# Patient Record
Sex: FEMALE | Race: WHITE | NOT HISPANIC OR LATINO | URBAN - METROPOLITAN AREA
[De-identification: names, ages, dates, MRNs, and addresses within clinical notes are randomized per-mention and may not be internally consistent; named-entity substitution may affect disease eponyms.]

---

## 2021-08-17 ENCOUNTER — OUTPATIENT (OUTPATIENT)
Dept: OUTPATIENT SERVICES | Facility: HOSPITAL | Age: 66
LOS: 1 days | End: 2021-08-17
Payer: COMMERCIAL

## 2021-08-17 ENCOUNTER — TRANSCRIPTION ENCOUNTER (OUTPATIENT)
Age: 66
End: 2021-08-17

## 2021-08-17 VITALS
WEIGHT: 191.36 LBS | HEART RATE: 84 BPM | HEIGHT: 62 IN | TEMPERATURE: 98 F | SYSTOLIC BLOOD PRESSURE: 109 MMHG | DIASTOLIC BLOOD PRESSURE: 76 MMHG | RESPIRATION RATE: 19 BRPM | OXYGEN SATURATION: 100 %

## 2021-08-17 DIAGNOSIS — Z20.828 CONTACT WITH AND (SUSPECTED) EXPOSURE TO OTHER VIRAL COMMUNICABLE DISEASES: ICD-10-CM

## 2021-08-17 LAB — SARS-COV-2 RNA SPEC QL NAA+PROBE: SIGNIFICANT CHANGE UP

## 2021-08-17 PROCEDURE — U0003: CPT

## 2021-08-17 PROCEDURE — U0005: CPT

## 2021-08-17 NOTE — ASU PREOP CHECKLIST - SPO2 (%)
[facility] PreOP/Phase II    [address]    [place] [address]    Phone:  [phone]                                       After Visit Summary   4/26/2017    Melody Guidry    MRN: [id number]           After Visit Summary Signature Page     I have received my discharge instructions, and my questions have been answered. I have discussed any challenges I see with this plan with the nurse or doctor.    ..........................................................................................................................................  Patient/Patient Representative Signature      ..........................................................................................................................................  Patient Representative Print Name and Relationship to Patient    ..................................................               ................................................  Date                                            Time    ..........................................................................................................................................  Reviewed by Signature/Title    ...................................................              ..............................................  Date                                                            Time           100

## 2021-08-17 NOTE — ASU PATIENT PROFILE, ADULT - ABILITY TO HEAR (WITH HEARING AID OR HEARING APPLIANCE IF NORMALLY USED):
Uses b/l hearing aids/Mildly to Moderately Impaired: difficulty hearing in some environments or speaker may need to increase volume or speak distinctly

## 2021-08-18 ENCOUNTER — OUTPATIENT (OUTPATIENT)
Dept: OUTPATIENT SERVICES | Facility: HOSPITAL | Age: 66
LOS: 1 days | End: 2021-08-18
Payer: COMMERCIAL

## 2021-08-18 VITALS
DIASTOLIC BLOOD PRESSURE: 48 MMHG | RESPIRATION RATE: 15 BRPM | SYSTOLIC BLOOD PRESSURE: 102 MMHG | OXYGEN SATURATION: 96 % | HEART RATE: 88 BPM

## 2021-08-18 DIAGNOSIS — Z96.659 PRESENCE OF UNSPECIFIED ARTIFICIAL KNEE JOINT: Chronic | ICD-10-CM

## 2021-08-18 DIAGNOSIS — Z98.42 CATARACT EXTRACTION STATUS, LEFT EYE: Chronic | ICD-10-CM

## 2021-08-18 DIAGNOSIS — H44.002 UNSPECIFIED PURULENT ENDOPHTHALMITIS, LEFT EYE: ICD-10-CM

## 2021-08-18 DIAGNOSIS — Z90.49 ACQUIRED ABSENCE OF OTHER SPECIFIED PARTS OF DIGESTIVE TRACT: Chronic | ICD-10-CM

## 2021-08-18 LAB
GRAM STN FLD: SIGNIFICANT CHANGE UP
SPECIMEN SOURCE: SIGNIFICANT CHANGE UP

## 2021-08-18 PROCEDURE — 87205 SMEAR GRAM STAIN: CPT

## 2021-08-18 PROCEDURE — 87102 FUNGUS ISOLATION CULTURE: CPT

## 2021-08-18 PROCEDURE — 67036 REMOVAL OF INNER EYE FLUID: CPT | Mod: AS,LT

## 2021-08-18 PROCEDURE — 87070 CULTURE OTHR SPECIMN AEROBIC: CPT

## 2021-08-18 PROCEDURE — 67036 REMOVAL OF INNER EYE FLUID: CPT | Mod: LT

## 2021-08-18 RX ORDER — VANCOMYCIN HCL 1 G
0.1 VIAL (EA) INTRAVENOUS ONCE
Refills: 0 | Status: DISCONTINUED | OUTPATIENT
Start: 2021-08-18 | End: 2021-09-01

## 2021-08-18 RX ORDER — CEFTAZIDIME 6 G/30ML
0.1 INJECTION, POWDER, FOR SOLUTION INTRAVENOUS ONCE
Refills: 0 | Status: DISCONTINUED | OUTPATIENT
Start: 2021-08-18 | End: 2021-09-01

## 2021-08-18 NOTE — PROVIDER CONTACT NOTE (OTHER) - SITUATION
Patient c/o pain in left eye
Patient schedules for retina sx d/t endophthalmitis . patient c/o pain in left eye 5/10 and also has a headache.

## 2021-08-18 NOTE — ASU DISCHARGE PLAN (ADULT/PEDIATRIC) - CARE PROVIDER_API CALL
Abraham Mcgrath)  Ophthalmology  [address] [place] [address], [address]  [place] [address]  Phone: [phone]  Fax: [phone]  Scheduled Appointment: 08/19/2021 09:00 AM

## 2021-08-22 ENCOUNTER — TRANSCRIPTION ENCOUNTER (OUTPATIENT)
Age: 66
End: 2021-08-22

## 2021-09-01 LAB
CULTURE RESULTS: SIGNIFICANT CHANGE UP
SPECIMEN SOURCE: SIGNIFICANT CHANGE UP

## 2021-09-18 LAB
CULTURE RESULTS: SIGNIFICANT CHANGE UP
SPECIMEN SOURCE: SIGNIFICANT CHANGE UP

## 2022-06-07 PROBLEM — I10 ESSENTIAL (PRIMARY) HYPERTENSION: Chronic | Status: ACTIVE | Noted: 2021-08-18

## 2022-06-08 ENCOUNTER — NON-APPOINTMENT (OUTPATIENT)
Age: 67
End: 2022-06-08

## 2022-06-08 ENCOUNTER — APPOINTMENT (OUTPATIENT)
Dept: OPHTHALMOLOGY | Facility: CLINIC | Age: 67
End: 2022-06-08
Payer: COMMERCIAL

## 2022-06-08 PROCEDURE — 92020 GONIOSCOPY: CPT

## 2022-06-08 PROCEDURE — 92002 INTRM OPH EXAM NEW PATIENT: CPT

## 2022-06-08 PROCEDURE — 76513 OPH US DX ANT SGM US UNI/BI: CPT | Mod: LT

## 2022-10-29 NOTE — ASU PATIENT PROFILE, ADULT - BRAND OF COVID-19 VACCINATION
Pt resting with eyes closed. Vital signs stable on monitor. Spouse at bedside. Awaiting CT.      Ana Mcconnell RN  10/29/22 [id number]
TRANSFER - OUT REPORT:    Verbal report given to zaynab landers on Zehra Riojas  being transferred to [facility] room 347 for routine progression of patient care       Report consisted of patient's Situation, Background, Assessment and   Recommendations(SBAR). Information from the following report(s) ED Encounter Summary was reviewed with the receiving nurse. Lines:   Peripheral IV 10/28/22 Left Antecubital (Active)        Opportunity for questions and clarification was provided.       Patient transported with:         Octavia Lockwood RN  10/29/22 [id number]
Pfizer dose 1 and 2

## 2022-11-06 ENCOUNTER — APPOINTMENT (OUTPATIENT)
Dept: ORTHOPEDIC SURGERY | Facility: CLINIC | Age: 67
End: 2022-11-06

## 2022-11-06 VITALS — BODY MASS INDEX: 32.78 KG/M2 | WEIGHT: 192 LBS | HEIGHT: 64 IN

## 2022-11-06 DIAGNOSIS — M19.072 PRIMARY OSTEOARTHRITIS, LEFT ANKLE AND FOOT: ICD-10-CM

## 2022-11-06 PROCEDURE — 99214 OFFICE O/P EST MOD 30 MIN: CPT

## 2022-11-06 PROCEDURE — 73630 X-RAY EXAM OF FOOT: CPT | Mod: LT

## 2022-11-06 RX ORDER — DICLOFENAC SODIUM 1% 10 MG/G
1 GEL TOPICAL DAILY
Qty: 1 | Refills: 3 | Status: ACTIVE | COMMUNITY
Start: 2022-11-06 | End: 1900-01-01

## 2022-11-06 NOTE — HISTORY OF PRESENT ILLNESS
[4] : 4 [Localized] : localized [3] : 3 [Sharp] : sharp [Intermittent] : intermittent [Meds] : meds [de-identified] : 11/6/22: pt is a 65 y/o F presents with left foot pain; onset of pain on October 30th 2022; pt states there was no injury; pt states that she only has pain on a bump on the side of her foot.\par \par PMH: HTN.\par Allergies: NKDA. [FreeTextEntry9] : moses [] : no

## 2022-11-06 NOTE — ASSESSMENT
[FreeTextEntry1] : Recommend firm arch supports.\par Motrin prn pain.\par Pt will rto with Dr. Lindquist in weeks as pt is leaving on vacation. \par Voltaren gel qid prn pain provided.

## 2022-11-06 NOTE — IMAGING
[Right] : right foot [de-identified] : Left foot with mild swelling to the 1st TMT and MTP  joints and there is mild ttp over this region.\par All digits are nvi with FAROM.\par Left ankle strength is 5/5 in all planes.\par Left ankle rom is full and painfree.\par There is no ligamentous laxity noted. \par EHL/FHL strength is 5/5.\par Gait is normal. 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

## 2022-12-19 ENCOUNTER — APPOINTMENT (OUTPATIENT)
Dept: ORTHOPEDIC SURGERY | Facility: CLINIC | Age: 67
End: 2022-12-19

## 2022-12-19 VITALS — WEIGHT: 192 LBS | HEIGHT: 64 IN | BODY MASS INDEX: 32.78 KG/M2

## 2022-12-19 DIAGNOSIS — M76.822 POSTERIOR TIBIAL TENDINITIS, LEFT LEG: ICD-10-CM

## 2022-12-19 DIAGNOSIS — I10 ESSENTIAL (PRIMARY) HYPERTENSION: ICD-10-CM

## 2022-12-19 PROCEDURE — 20550 NJX 1 TENDON SHEATH/LIGAMENT: CPT

## 2022-12-19 PROCEDURE — 99214 OFFICE O/P EST MOD 30 MIN: CPT | Mod: 25

## 2022-12-19 NOTE — HISTORY OF PRESENT ILLNESS
[4] : 4 [3] : 3 [Localized] : localized [Sharp] : sharp [Intermittent] : intermittent [de-identified] : 12/19/2022: 1.5 month medial foot pain. no injury. went to [facility] and told to use voltaren. no prior foot probs. denies dm/tob  [] : Post Surgical Visit: no [FreeTextEntry1] : LT foot

## 2022-12-19 NOTE — ASSESSMENT
[FreeTextEntry1] : wbat\par supportive shoe\par nsaids prn\par PT\par requesting csi today\par f/up 3 months\par \par Medium Joint Injection was performed because of pain and inflammation Anesthesia: ethyl chloride sprayed topically. An injection of Celestone 1cc and Lidocaine without epinephrine 1cc was injected in the left posterior tibial tendon sheath.  Patient has tried OTC's including aspirin, Ibuprofen, Aleve etc or prescription NSAIDS, and/or exercises at home and/ or physical therapy without satisfactory response After verbal consent using sterile preparation and technique.The risks, benefits, and alternatives to cortisone injection were explained in full to the patient. Risks outlined include but are not limited to infection, sepsis, bleeding, scarring, skin discoloration, temporary increase in pain, syncopal episode, failure to resolve symptoms, allergic reaction, symptom recurrence, and elevation of blood sugar in diabetics. Patient understood the risks. All questions were answered. After discussion of options, patient requested an injection. Oral informed consent was obtained and sterile prep was done of the injection site. Sterile technique was utilized for the procedure including the preparation of the solutions used for the injection. Patient tolerated the procedure well. Advised to ice the injection site this evening. Prep with betadine locally to site. Sterile technique used.\par

## 2022-12-19 NOTE — IMAGING
[Left] : left foot [Outside films reviewed] : Outside films reviewed [There are no fractures, subluxations or dislocations. No significant abnormalities are seen] : There are no fractures, subluxations or dislocations. No significant abnormalities are seen

## 2022-12-19 NOTE — PHYSICAL EXAM
[Left] : left foot and ankle [Mild] : mild swelling of medial foot [NL (40)] : plantar flexion 40 degrees [NL 30)] : inversion 30 degrees [NL (20)] : eversion 20 degrees [5___] : eversion 5[unfilled]/5 [2+] : dorsalis pedis pulse: 2+ [] : patient ambulates without assistive device

## 2023-03-13 ENCOUNTER — APPOINTMENT (OUTPATIENT)
Dept: ORTHOPEDIC SURGERY | Facility: CLINIC | Age: 68
End: 2023-03-13

## 2023-06-03 ENCOUNTER — NON-APPOINTMENT (OUTPATIENT)
Age: 68
End: 2023-06-03

## 2023-08-14 ENCOUNTER — NON-APPOINTMENT (OUTPATIENT)
Age: 68
End: 2023-08-14

## 2023-08-22 ENCOUNTER — NON-APPOINTMENT (OUTPATIENT)
Age: 68
End: 2023-08-22

## 2023-09-16 ENCOUNTER — NON-APPOINTMENT (OUTPATIENT)
Age: 68
End: 2023-09-16

## 2023-09-17 ENCOUNTER — INPATIENT (INPATIENT)
Facility: HOSPITAL | Age: 68
LOS: 2 days | Discharge: HOME | DRG: 690 | End: 2023-09-20
Attending: INTERNAL MEDICINE | Admitting: INTERNAL MEDICINE
Payer: MEDICARE

## 2023-09-17 VITALS
TEMPERATURE: 99 F | HEIGHT: 64 IN | SYSTOLIC BLOOD PRESSURE: 137 MMHG | RESPIRATION RATE: 16 BRPM | OXYGEN SATURATION: 96 % | HEART RATE: 79 BPM | WEIGHT: 194.01 LBS | DIASTOLIC BLOOD PRESSURE: 81 MMHG

## 2023-09-17 DIAGNOSIS — K21.9 GASTRO-ESOPHAGEAL REFLUX DISEASE WITHOUT ESOPHAGITIS: ICD-10-CM

## 2023-09-17 DIAGNOSIS — N39.0 URINARY TRACT INFECTION, SITE NOT SPECIFIED: ICD-10-CM

## 2023-09-17 DIAGNOSIS — Z98.42 CATARACT EXTRACTION STATUS, LEFT EYE: Chronic | ICD-10-CM

## 2023-09-17 DIAGNOSIS — G47.00 INSOMNIA, UNSPECIFIED: ICD-10-CM

## 2023-09-17 DIAGNOSIS — Z90.49 ACQUIRED ABSENCE OF OTHER SPECIFIED PARTS OF DIGESTIVE TRACT: Chronic | ICD-10-CM

## 2023-09-17 DIAGNOSIS — I10 ESSENTIAL (PRIMARY) HYPERTENSION: ICD-10-CM

## 2023-09-17 DIAGNOSIS — Z29.9 ENCOUNTER FOR PROPHYLACTIC MEASURES, UNSPECIFIED: ICD-10-CM

## 2023-09-17 DIAGNOSIS — Z96.659 PRESENCE OF UNSPECIFIED ARTIFICIAL KNEE JOINT: Chronic | ICD-10-CM

## 2023-09-17 LAB
ALBUMIN SERPL ELPH-MCNC: 3.8 G/DL — SIGNIFICANT CHANGE UP (ref 3.3–5)
ALP SERPL-CCNC: 67 U/L — SIGNIFICANT CHANGE UP (ref 40–120)
ALT FLD-CCNC: 34 U/L — SIGNIFICANT CHANGE UP (ref 12–78)
ANION GAP SERPL CALC-SCNC: 11 MMOL/L — SIGNIFICANT CHANGE UP (ref 5–17)
APPEARANCE UR: CLEAR — SIGNIFICANT CHANGE UP
APTT BLD: 31.4 SEC — SIGNIFICANT CHANGE UP (ref 24.5–35.6)
AST SERPL-CCNC: 20 U/L — SIGNIFICANT CHANGE UP (ref 15–37)
BASOPHILS # BLD AUTO: 0.05 K/UL — SIGNIFICANT CHANGE UP (ref 0–0.2)
BASOPHILS NFR BLD AUTO: 1.1 % — SIGNIFICANT CHANGE UP (ref 0–2)
BILIRUB SERPL-MCNC: 0.6 MG/DL — SIGNIFICANT CHANGE UP (ref 0.2–1.2)
BILIRUB UR-MCNC: NEGATIVE — SIGNIFICANT CHANGE UP
BUN SERPL-MCNC: 16 MG/DL — SIGNIFICANT CHANGE UP (ref 7–23)
CALCIUM SERPL-MCNC: 9.3 MG/DL — SIGNIFICANT CHANGE UP (ref 8.5–10.1)
CHLORIDE SERPL-SCNC: 106 MMOL/L — SIGNIFICANT CHANGE UP (ref 96–108)
CO2 SERPL-SCNC: 24 MMOL/L — SIGNIFICANT CHANGE UP (ref 22–31)
COLOR SPEC: SIGNIFICANT CHANGE UP
CREAT SERPL-MCNC: 0.59 MG/DL — SIGNIFICANT CHANGE UP (ref 0.5–1.3)
DIFF PNL FLD: NEGATIVE — SIGNIFICANT CHANGE UP
EGFR: 99 ML/MIN/1.73M2 — SIGNIFICANT CHANGE UP
EOSINOPHIL # BLD AUTO: 0.06 K/UL — SIGNIFICANT CHANGE UP (ref 0–0.5)
EOSINOPHIL NFR BLD AUTO: 1.3 % — SIGNIFICANT CHANGE UP (ref 0–6)
GLUCOSE SERPL-MCNC: 108 MG/DL — HIGH (ref 70–99)
GLUCOSE UR QL: NEGATIVE MG/DL — SIGNIFICANT CHANGE UP
HCT VFR BLD CALC: 41.9 % — SIGNIFICANT CHANGE UP (ref 34.5–45)
HGB BLD-MCNC: 14.2 G/DL — SIGNIFICANT CHANGE UP (ref 11.5–15.5)
IMM GRANULOCYTES NFR BLD AUTO: 0.2 % — SIGNIFICANT CHANGE UP (ref 0–0.9)
INR BLD: 0.91 RATIO — SIGNIFICANT CHANGE UP (ref 0.85–1.18)
KETONES UR-MCNC: NEGATIVE MG/DL — SIGNIFICANT CHANGE UP
LACTATE SERPL-SCNC: 0.9 MMOL/L — SIGNIFICANT CHANGE UP (ref 0.7–2)
LEUKOCYTE ESTERASE UR-ACNC: ABNORMAL
LYMPHOCYTES # BLD AUTO: 0.61 K/UL — LOW (ref 1–3.3)
LYMPHOCYTES # BLD AUTO: 13.3 % — SIGNIFICANT CHANGE UP (ref 13–44)
MCHC RBC-ENTMCNC: 31.4 PG — SIGNIFICANT CHANGE UP (ref 27–34)
MCHC RBC-ENTMCNC: 33.9 GM/DL — SIGNIFICANT CHANGE UP (ref 32–36)
MCV RBC AUTO: 92.7 FL — SIGNIFICANT CHANGE UP (ref 80–100)
MONOCYTES # BLD AUTO: 0.33 K/UL — SIGNIFICANT CHANGE UP (ref 0–0.9)
MONOCYTES NFR BLD AUTO: 7.2 % — SIGNIFICANT CHANGE UP (ref 2–14)
NEUTROPHILS # BLD AUTO: 3.54 K/UL — SIGNIFICANT CHANGE UP (ref 1.8–7.4)
NEUTROPHILS NFR BLD AUTO: 76.9 % — SIGNIFICANT CHANGE UP (ref 43–77)
NITRITE UR-MCNC: NEGATIVE — SIGNIFICANT CHANGE UP
NRBC # BLD: 0 /100 WBCS — SIGNIFICANT CHANGE UP (ref 0–0)
PH UR: 6.5 — SIGNIFICANT CHANGE UP (ref 5–8)
PLATELET # BLD AUTO: 296 K/UL — SIGNIFICANT CHANGE UP (ref 150–400)
POTASSIUM SERPL-MCNC: 3.7 MMOL/L — SIGNIFICANT CHANGE UP (ref 3.5–5.3)
POTASSIUM SERPL-SCNC: 3.7 MMOL/L — SIGNIFICANT CHANGE UP (ref 3.5–5.3)
PROT SERPL-MCNC: 7.7 G/DL — SIGNIFICANT CHANGE UP (ref 6–8.3)
PROT UR-MCNC: NEGATIVE MG/DL — SIGNIFICANT CHANGE UP
PROTHROM AB SERPL-ACNC: 10.7 SEC — SIGNIFICANT CHANGE UP (ref 9.5–13)
RBC # BLD: 4.52 M/UL — SIGNIFICANT CHANGE UP (ref 3.8–5.2)
RBC # FLD: 12.9 % — SIGNIFICANT CHANGE UP (ref 10.3–14.5)
SARS-COV-2 RNA SPEC QL NAA+PROBE: SIGNIFICANT CHANGE UP
SODIUM SERPL-SCNC: 141 MMOL/L — SIGNIFICANT CHANGE UP (ref 135–145)
SP GR SPEC: 1 — SIGNIFICANT CHANGE UP (ref 1–1.03)
UROBILINOGEN FLD QL: 0.2 MG/DL — SIGNIFICANT CHANGE UP (ref 0.2–1)
WBC # BLD: 4.6 K/UL — SIGNIFICANT CHANGE UP (ref 3.8–10.5)
WBC # FLD AUTO: 4.6 K/UL — SIGNIFICANT CHANGE UP (ref 3.8–10.5)

## 2023-09-17 PROCEDURE — 99222 1ST HOSP IP/OBS MODERATE 55: CPT

## 2023-09-17 PROCEDURE — 99222 1ST HOSP IP/OBS MODERATE 55: CPT | Mod: GC

## 2023-09-17 PROCEDURE — 71045 X-RAY EXAM CHEST 1 VIEW: CPT | Mod: 26

## 2023-09-17 PROCEDURE — 99285 EMERGENCY DEPT VISIT HI MDM: CPT | Mod: FS

## 2023-09-17 PROCEDURE — 93010 ELECTROCARDIOGRAM REPORT: CPT

## 2023-09-17 RX ORDER — SODIUM CHLORIDE 9 MG/ML
1000 INJECTION INTRAMUSCULAR; INTRAVENOUS; SUBCUTANEOUS
Refills: 0 | Status: DISCONTINUED | OUTPATIENT
Start: 2023-09-17 | End: 2023-09-19

## 2023-09-17 RX ORDER — ERTAPENEM SODIUM 1 G/1
1000 INJECTION, POWDER, LYOPHILIZED, FOR SOLUTION INTRAMUSCULAR; INTRAVENOUS ONCE
Refills: 0 | Status: COMPLETED | OUTPATIENT
Start: 2023-09-17 | End: 2023-09-17

## 2023-09-17 RX ORDER — LOSARTAN POTASSIUM 100 MG/1
50 TABLET, FILM COATED ORAL DAILY
Refills: 0 | Status: DISCONTINUED | OUTPATIENT
Start: 2023-09-18 | End: 2023-09-20

## 2023-09-17 RX ORDER — LANOLIN ALCOHOL/MO/W.PET/CERES
3 CREAM (GRAM) TOPICAL AT BEDTIME
Refills: 0 | Status: DISCONTINUED | OUTPATIENT
Start: 2023-09-17 | End: 2023-09-18

## 2023-09-17 RX ORDER — ENOXAPARIN SODIUM 100 MG/ML
40 INJECTION SUBCUTANEOUS EVERY 24 HOURS
Refills: 0 | Status: DISCONTINUED | OUTPATIENT
Start: 2023-09-17 | End: 2023-09-20

## 2023-09-17 RX ORDER — LOSARTAN POTASSIUM 100 MG/1
50 TABLET, FILM COATED ORAL DAILY
Refills: 0 | Status: DISCONTINUED | OUTPATIENT
Start: 2023-09-17 | End: 2023-09-17

## 2023-09-17 RX ORDER — ACETAMINOPHEN 500 MG
650 TABLET ORAL EVERY 6 HOURS
Refills: 0 | Status: DISCONTINUED | OUTPATIENT
Start: 2023-09-17 | End: 2023-09-20

## 2023-09-17 RX ORDER — SODIUM CHLORIDE 9 MG/ML
1000 INJECTION INTRAMUSCULAR; INTRAVENOUS; SUBCUTANEOUS ONCE
Refills: 0 | Status: COMPLETED | OUTPATIENT
Start: 2023-09-17 | End: 2023-09-17

## 2023-09-17 RX ORDER — INFLUENZA VIRUS VACCINE 15; 15; 15; 15 UG/.5ML; UG/.5ML; UG/.5ML; UG/.5ML
0.7 SUSPENSION INTRAMUSCULAR ONCE
Refills: 0 | Status: DISCONTINUED | OUTPATIENT
Start: 2023-09-17 | End: 2023-09-20

## 2023-09-17 RX ORDER — ERTAPENEM SODIUM 1 G/1
1000 INJECTION, POWDER, LYOPHILIZED, FOR SOLUTION INTRAMUSCULAR; INTRAVENOUS EVERY 24 HOURS
Refills: 0 | Status: DISCONTINUED | OUTPATIENT
Start: 2023-09-18 | End: 2023-09-20

## 2023-09-17 RX ORDER — FAMOTIDINE 10 MG/ML
20 INJECTION INTRAVENOUS DAILY
Refills: 0 | Status: DISCONTINUED | OUTPATIENT
Start: 2023-09-17 | End: 2023-09-18

## 2023-09-17 RX ORDER — LACTOBACILLUS ACIDOPHILUS 100MM CELL
1 CAPSULE ORAL DAILY
Refills: 0 | Status: DISCONTINUED | OUTPATIENT
Start: 2023-09-17 | End: 2023-09-18

## 2023-09-17 RX ORDER — LOSARTAN POTASSIUM 100 MG/1
50 TABLET, FILM COATED ORAL ONCE
Refills: 0 | Status: COMPLETED | OUTPATIENT
Start: 2023-09-17 | End: 2023-09-17

## 2023-09-17 RX ADMIN — ENOXAPARIN SODIUM 40 MILLIGRAM(S): 100 INJECTION SUBCUTANEOUS at 18:51

## 2023-09-17 RX ADMIN — SODIUM CHLORIDE 1000 MILLILITER(S): 9 INJECTION INTRAMUSCULAR; INTRAVENOUS; SUBCUTANEOUS at 13:43

## 2023-09-17 RX ADMIN — SODIUM CHLORIDE 60 MILLILITER(S): 9 INJECTION INTRAMUSCULAR; INTRAVENOUS; SUBCUTANEOUS at 18:51

## 2023-09-17 RX ADMIN — ERTAPENEM SODIUM 120 MILLIGRAM(S): 1 INJECTION, POWDER, LYOPHILIZED, FOR SOLUTION INTRAMUSCULAR; INTRAVENOUS at 12:03

## 2023-09-17 RX ADMIN — Medication 650 MILLIGRAM(S): at 23:07

## 2023-09-17 RX ADMIN — SODIUM CHLORIDE 1000 MILLILITER(S): 9 INJECTION INTRAMUSCULAR; INTRAVENOUS; SUBCUTANEOUS at 12:03

## 2023-09-17 RX ADMIN — ERTAPENEM SODIUM 1000 MILLIGRAM(S): 1 INJECTION, POWDER, LYOPHILIZED, FOR SOLUTION INTRAMUSCULAR; INTRAVENOUS at 13:43

## 2023-09-17 RX ADMIN — Medication 3 MILLIGRAM(S): at 21:33

## 2023-09-17 NOTE — H&P ADULT - HISTORY OF PRESENT ILLNESS
67-year-old female with history of hypertension presents with continued dysuria, frequency, and urgency for the past month.  Patient reports a new sexual partner in June 2023.  Developed a UTI.  Was initially put on Macrobid and then switched to Cipro.  Developed another UTI last month and put on Cipro and then changed to Keflex.  States urinary symptoms have continued.  Saw a urologist 2 months ago and was told urine cultures show multiple resistance and will need IV antibiotics.  Patient states she misunderstood and thought she needed to go to the emergency room if a fever develop.  Patient denies any fever.  Patient got a culture report in the mail recently and thought the infection was susceptible to amoxicillin.  Went to urgent care this morning and was told she read the culture results a inaccurately and told to come to emergency room for IV antibiotics, admission, and to see infectious disease.  Patient denies any abdominal pain, flank pain, nausea, vomiting, fever, body aches.  Patient reports urinary symptoms have continued.  Denies any pain at rest but reports dysuria and urgency with urination.     ED course:  T 98.6, HR 79, /81, RR 16, SpO2 96% RA  S/P 1 L NS, Ertapenem 1 g   EKG:  Labs significant for Lactate 0.9, WBC 4.6,  BUN/Cr 16/0.59, K 3.7  U/A: Moderate Leuk esterase, negative nitrite, WBC 25, Many bacteria  COVID neg  Imaging:  CXR: unremarkable on wet read, FU official read 67-year-old female with history of HTN, insomnia, GERD presents with continued dysuria, frequency, and urgency for the past month.  Patient reports a new sexual partner in June 2023.  Developed a UTI.  Initially prescribed Macrobid and then switched to Cipro.  Developed another UTI in August, prescribed Cipro and then changed to Keflex.  States urinary symptoms have continued despite treatment.  Saw a urologist 2 months ago and was told urine cultures show multiple resistance and to follow up with Infectious Disease. Pt received UCx report from PCP demonstrating ESBL Klebsiella pneumoniae. Went to urgent care this morning where she was told to come to emergency room. Endorses suprapubic fullness/discmofort, dysuria and urgency with urination. Patient denies any abdominal pain, flank pain, nausea, vomiting, fever, body aches.    ED course:  T 98.6, HR 79, /81, RR 16, SpO2 96% RA  S/P 1 L NS, Ertapenem 1 g   EKG: NSR, VR 71 bpm  Labs significant for Lactate 0.9, WBC 4.6,  BUN/Cr 16/0.59, K 3.7  U/A: Moderate Leuk esterase, negative nitrite, WBC 25, Many bacteria  COVID neg  Imaging:  CXR: unremarkable on wet read, FU official read

## 2023-09-17 NOTE — ED PROVIDER NOTE - CLINICAL SUMMARY MEDICAL DECISION MAKING FREE TEXT BOX
68 y/o F hx of HTN presenting with recurrent UTI and failed outpatient therapy.  Outpatient UCx growing ESBL Kleb sensitive to Ertapenem  Check screening labs, rpt UA/UCx, BCx  Needs admission for parental antibiotics.   Non-toxic appearing 68 y/o F hx of HTN presenting with recurrent UTI and failed outpatient therapy.  Outpatient UCx growing ESBL Kleb sensitive to Ertapenem  Check screening labs, rpt UA/UCx, BCx  Continues to be symptomatic with urinary frequency. Needs admission for parental antibiotics.   Non-toxic appearing 68 y/o F hx of HTN presenting with recurrent UTI and failed outpatient therapy.  Outpatient UCx growing ESBL Kleb sensitive to Ertapenem  Check screening labs, rpt UA/UCx, BCx  Continues to be symptomatic with urinary frequency. Needs admission for parental antibiotics.   Non-toxic appearing  Admitted to medical service

## 2023-09-17 NOTE — ED PROVIDER NOTE - DIFFERENTIAL DIAGNOSIS
Differential Diagnosis Differentials include but not limited to urinary tract infection, bacteremia.  No flank pain, nausea, vomiting, or fevers to suggest pyelonephritis, septic stone, or renal calculi

## 2023-09-17 NOTE — H&P ADULT - NSHPPHYSICALEXAM_GEN_ALL_CORE
Vital Signs Last 24 Hrs  T(C): 37 (17 Sep 2023 10:43), Max: 37 (17 Sep 2023 10:43)  T(F): 98.6 (17 Sep 2023 10:43), Max: 98.6 (17 Sep 2023 10:43)  HR: 79 (17 Sep 2023 10:43) (79 - 79)  BP: 137/81 (17 Sep 2023 10:43) (137/81 - 137/81)  BP(mean): --  ABP: --  ABP(mean): --  RR: 16 (17 Sep 2023 10:43) (16 - 16)  SpO2: 96% (17 Sep 2023 10:43) (96% - 96%)    O2 Parameters below as of 17 Sep 2023 10:43  Patient On (Oxygen Delivery Method): room air      CONSTITUTIONAL: awake, alert, no acute distress  HEENT: Atraumatic normocephalic. Moist mucous membranes.  No conjunctival injection.  RESP: No respiratory distress; CTA b/l, no WRR  CV: RRR, +S1S2, no MRG  GI: Bowel sounds present. Soft, nontender, nondistended, no rebound or guarding  MSK: Moving all four extremities spontaneously. No peripheral edema. No calf tenderness.  SKIN: Warm and dry. No rashes noted.  NEURO: AOx3, answering questions and following commands appropriately  PSYCH: Mood and affect appropriate, recent memory intact

## 2023-09-17 NOTE — CONSULT NOTE ADULT - ASSESSMENT
67y  Female with history of HTN, insomnia, GERD. Patient presents with continued dysuria, frequency, and urgency initially started in August. Patient reports a new sexual partner in June 2023 whom which she has vaginal intercourse. She developed a UTI in June and was treated by an urgent care center with Macrobid and then switched to Cipro. Developed another UTI in August, prescribed Cipro and then changed to Keflex also treated by an urgent care. Patient saw her GYN also and was tested fro STI's which she reports are negative. Her urinary symptoms initially improved and recently restarted after sexual intercourse in August. She reports she voids after intercourse, always wipes down, and was not told by her GYN that she has vaginal dryness. She was referred to a urologist and saw them in August who referred her to an ID doctor, one in [facility] who has not returned her messages, another in [place] who has no availability and finally was able to book an appointment in [place] for october. Due to her worsening symptoms and fever, urine cultures with multiple resistance she decided to come to the ER. Here she is afebrile, no leukocytosis. Started on Ertapenem.      ESBL Klebsiella pneumoniae UTI  Dysuria  Frequency     - Blood cultures pending  - Urine culture 9/5 with ESBL Klebsiella pneumoniae  - Urine culture from ED pending  - UA with pyuria   - Continue Ertapenem   - Follow up cultures  - Trend Fever  - Trend WBC      Thank you for allowing me to participate in the care of your patient.   Will Follow    Discussed treatment plan with: Dr Newton

## 2023-09-17 NOTE — H&P ADULT - PROBLEM SELECTOR PLAN 2
Chronic  - BP elevated on admission, 137/81 -> 158/77  - pt took 1 dose irbesartan this AM  - hold home HCTZ in setting of dehydration, elevated BUN:Cr

## 2023-09-17 NOTE — ED PROVIDER NOTE - ATTENDING SHARED VISIT SELECTORS
Implemented All Universal Safety Interventions:  [place] to call system. Call bell, personal items and telephone within reach. Instruct patient to call for assistance. Room bathroom lighting operational. Non-slip footwear when patient is off stretcher. Physically safe environment: no spills, clutter or unnecessary equipment. Stretcher in lowest position, wheels locked, appropriate side rails in place. History/Exam/Medical Decision Making

## 2023-09-17 NOTE — CONSULT NOTE ADULT - SUBJECTIVE AND OBJECTIVE BOX
[facility]  INFECTIOUS DISEASES   [address] [place] [address]  Tel: [phone]     Fax: [phone]  =======================================================      [id number]  TANIA VICTOR    CC: Patient is a 67y old  Female who presents with a chief complaint of ESBL Klebisella UTI (17 Sep 2023 14:17)      67y  Female with history of HTN, insomnia, GERD. Patient presents with continued dysuria, frequency, and urgency initially started in August. Patient reports a new sexual partner in June 2023 whom which she has vaginal intercourse. She developed a UTI in June and was treated by an urgent care center with Macrobid and then switched to Cipro. Developed another UTI in August, prescribed Cipro and then changed to Keflex also treated by an urgent care. Patient saw her GYN also and was tested fro STI's which she reports are negative. Her urinary symptoms initially improved and recently restarted after sexual intercourse in August. She reports she voids after intercourse, always wipes down, and was not told by her GYN that she has vaginal dryness. She was referred to a urologist and saw them in August who referred her to an ID doctor, one in [facility] who has not returned her messages, another in [place] who has no availability and finally was able to book an appointment in [place] for october. Due to her worsening symptoms and fever, urine cultures with multiple resistance she decided to come to the ER. Here she is afebrile, no leukocytosis. Started on Ertapenem. ID input requested.       Past Medical & Surgical Hx:  HTN (hypertension)  S/P appy  S/P knee replacement left  S/P cataract surgery, left      Social Hx:  Denies smoking       FAMILY HISTORY:  HTN - Mother       Allergies  tape (Rash)  No Known Drug Allergies      REVIEW OF SYSTEMS:  CONSTITUTIONAL:  No Fever or chills  HEENT:  No diplopia or blurred vision.  No earache, sore throat or runny nose.  CARDIOVASCULAR:  No chest pain  RESPIRATORY:  No cough, shortness of breath  GASTROINTESTINAL:  No nausea, vomiting or diarrhea.  GENITOURINARY:  + dysuria,  + frequency. No  urgency. No Blood in urine  MUSCULOSKELETAL:  no joint aches, no muscle pain  SKIN:  No change in skin, hair or nails.  NEUROLOGIC:  No Headaches      Physical Exam:  GEN: NAD  HEENT: normocephalic and atraumatic. EOMI. PERRL.    NECK: Supple.   LUNGS: CTA B/L.  HEART: RRR  ABDOMEN: Soft, NT, ND.  +BS.    : No CVA tenderness  EXTREMITIES: Without  edema.  MSK: No joint swelling  NEUROLOGIC: No Focal Deficits   PSYCHIATRIC: Appropriate affect .  SKIN: No rash    Height (cm): 162.6 (09-17 @ 10:43)  Weight (kg): 88 (09-17 @ 10:43)  BMI (kg/m2): 33.3 (09-17 @ 10:43)  BSA (m2): 1.93 (09-17 @ 10:43)      Vitals:  T(F): 97.9 (17 Sep 2023 17:10), Max: 98.6 (17 Sep 2023 10:43)  HR: 82 (17 Sep 2023 17:10)  BP: 175/77 (17 Sep 2023 17:10)  RR: 18 (17 Sep 2023 17:10)  SpO2: 95% (17 Sep 2023 17:10) (95% - 96%)  temp max in last 48H T(F): , Max: 98.6 (09-17-23 @ 10:43)    Current Antibiotics:  ertapenem  IVPB 1000 milliGRAM(s) IV Intermittent every 24 hours    Other medications:  enoxaparin Injectable 40 milliGRAM(s) SubCutaneous every 24 hours  famotidine    Tablet 20 milliGRAM(s) Oral daily  influenza  Vaccine (HIGH DOSE) 0.7 milliLiter(s) IntraMuscular once  lactobacillus acidophilus 1 Tablet(s) Oral daily  losartan 50 milliGRAM(s) Oral once  melatonin 3 milliGRAM(s) Oral at bedtime  sodium chloride 0.9%. 1000 milliLiter(s) IV Continuous <Continuous>                 14.2   4.60  )-----------( 296      ( 17 Sep 2023 11:30 )             41.9     09-17    141  |  106  |  16  ----------------------------<  108<H>  3.7   |  24  |  0.59    Ca    9.3      17 Sep 2023 11:30    TPro  7.7  /  Alb  3.8  /  TBili  0.6  /  DBili  x   /  AST  20  /  ALT  34  /  AlkPhos  67  09-17    RECENT CULTURES:      WBC Count: 4.60 K/uL (09-17-23 @ 11:30)    Creatinine: 0.59 mg/dL (09-17-23 @ 11:30)    COVID-19 PCR: NotDetec (09-17-23 @ 11:30)    Urinalysis (09.17.23 @ 11:30)    Glucose Qualitative, Urine: Negative mg/dL   Blood, Urine: Negative   pH Urine: 6.5   Color: Dark Yellow   Urine Appearance: Clear   Bilirubin: Negative   Ketone - Urine: Negative mg/dL   Specific Gravity: 1.005   Protein, Urine: Negative mg/dL   Urobilinogen: 0.2 mg/dL   Nitrite: Negative   Leukocyte Esterase Concentration: Moderate  Urine Microscopic-Add On (NC) (09.17.23 @ 11:30)    Red Blood Cell - Urine: 2 /HPF   White Blood Cell - Urine: 25 /HPF   Bacteria: Many /HPF   Comment - Urine: Few WBC Clumps Seen.              Outside culture result 9/5/23:    Order Start Date & Time  09- 17:25  Result Date & Time    09- 17:57        Ordering Clinician  Elvi Garnett  Result Status  Final        Specimen  None  Last Updated At  [facility]        Observation Date & Time  09- 16:09  Lab Number  [id number]        Specimen Received Date & Time    09- 09:28  Order Number  [id number]     Culture Result   >100,000 CFU/ml Klebsiella pneumoniae  >100,000 CFU/ml Klebsiella pneumoniae ESBL     Final     Amikacin  S (<=16)   Ciprofloxacin R (>2)   Ertapenem S (<=0.5)   Gentamicin  R (>8)   Meropenem S (<=1)   Tobramycin R (>8)   Trimethoprim/Sulfamethoxazole R (>2/38)

## 2023-09-17 NOTE — H&P ADULT - NSHPSOCIALHISTORY_GEN_ALL_CORE
Tobacco: Denies  EtOH: Denies   Recreational drug use: Denies  Lives with:   Ambulates: unassisted   ADLs: able to cook, clean, and shop independently  Occupation: retired Tobacco: Denies  EtOH: Socially   Recreational drug use: Denies  Ambulates: unassisted   ADLs: able to cook, clean, and shop independently  Occupation: retired

## 2023-09-17 NOTE — H&P ADULT - ASSESSMENT
68 yo female with history of hypertension and insomnia, presents with dysuria x 1 month, with recent urine culture of multidrug resistant ESBL Viralseuriela. Admitted for IV antibiotics.  68 yo female with history of hypertension and insomnia, presents with dysuria x 1 month, with recent urine culture of multidrug resistant ESBL Klebisella. Admitted for IV antibiotics for UTI 2/2 ESBL Klebsiella.

## 2023-09-17 NOTE — H&P ADULT - ATTENDING COMMENTS
66 yo female with history of hypertension and insomnia, presents with dysuria x 1 month, with recent urine culture of multidrug resistant ESBL Klebisella. Admitted for IV antibiotics for UTI 2/2 ESBL Klebsiella.     Pt started on ertapenem, will cont for now based on urine cx sensitivities, ID/Dr. Jarvis consulted, will f/u recs.  Monitor pt for symptom improvement, and trend WBC/fever curves.  Cont probiotic.    Kelvin Newton, Attending Physician

## 2023-09-17 NOTE — H&P ADULT - SOCIAL HISTORY
Telephone Encounter by Radha Owusu RN at 03/01/17 09:06 AM     Author:  Radha Owusu RN Service:  (none) Author Type:  Registered Nurse     Filed:  03/01/17 09:06 AM Encounter Date:  3/1/2017 Status:  Signed     :  Radha Owusu RN (Registered Nurse)       From: Maria R Rowell  To: Ye Fajardo MD  Sent: 3/1/2017  6:23 AM CST  Subject: Medication Questions    I have come down with a sinus infection.  It started on Monday with a sore   throat and sneezing, yesterday the congestion started.  I have take 2   Mucinex DM and am blowing my nose frequently.  The congestion and pressure   are pretty bad...pressure in my forehead, face and ears.   I do have some   coughing as well, but does not see to be in my chest.  I am hoping you   would be able to call in a prescription to [facility] for   me.  We are leaving out of town on Friday and I am worried about the   pressure from the sinus infection and flying.  (please no Z pack or   anything with asprin)  Thank you,  Maria R Rowell       Revision History        Date/Time User Provider Type Action    > 03/01/17 09:06 AM Radha Owusu RN Registered Nurse Sign    Attribution information within the note text is not available.             No

## 2023-09-17 NOTE — ED PROVIDER NOTE - OBJECTIVE STATEMENT
67-year-old female with history of hypertension presents with continued dysuria, frequency, and urgency for the past month.  Patient reports a new sexual partner in June 2023.  Developed a UTI.  Was initially put on Macrobid and then switched to Cipro.  Developed another UTI last month and put on Cipro and then changed to Keflex.  States urinary symptoms have continued.  Saw a urologist 2 months ago and was told urine cultures show multiple resistance and will need IV antibiotics.  Patient states she misunderstood and thought she needed to go to the emergency room if a fever develop.  Patient denies any fever.  Patient got a culture report in the mail recently and thought the infection was susceptible to amoxicillin.  Went to urgent care this morning and was told she read the culture results a inaccurately and told to come to emergency room for IV antibiotics, admission, and to see infectious disease.  Patient denies any abdominal pain, flank pain, nausea, vomiting, fever, body aches.  Patient reports urinary symptoms have continued.  Denies any pain at rest but reports dysuria and urgency with urination.   PCP Kamryn Dumas   U[facility]

## 2023-09-17 NOTE — PATIENT PROFILE ADULT - FALL HARM RISK - RISK INTERVENTIONS

## 2023-09-17 NOTE — ED ADULT TRIAGE NOTE - CHIEF COMPLAINT QUOTE
Patient is a 66yo female complaining of UTI for 1 month Patient states that she has seen the urologist Patient states that he was on Cipro and Keflex with no reduction of symptoms Patient states that  sent her here to see a Infectious Disease MD and IV antibiotics

## 2023-09-17 NOTE — H&P ADULT - NSHPREVIEWOFSYSTEMS_GEN_ALL_CORE
REVIEW OF SYSTEMS:  CONSTITUTIONAL: No fever, chills or fatigue.  HENMT: No HA, dizziness, rhinorrhea  RESPIRATORY: No cough or shortness of breath.  CARDIOVASCULAR: No chest pain, palpitations.  GASTROINTESTINAL: No abdominal pain. No nausea or vomiting; No diarrhea or constipation. No blood per rectum.  GENITOURINARY: No dysuria, changes in frequency, hematuria, or incontinence  NEUROLOGICAL: Baseline strength. Sensation intact bilaterally.  SKIN: No itching, rashes  MUSCULOSKELETAL: No joint pain or swelling; No muscle, back, or extremity pain REVIEW OF SYSTEMS:  CONSTITUTIONAL: No fever, chills or fatigue.  HENMT: No HA, dizziness, rhinorrhea  RESPIRATORY: No cough or shortness of breath.  CARDIOVASCULAR: No chest pain, palpitations.  GASTROINTESTINAL: + suprapubic fullness; No abdominal pain. No nausea or vomiting; No diarrhea or constipation. No blood per rectum.  GENITOURINARY: + dysuria, + frequency, No hematuria, or incontinence  NEUROLOGICAL: Baseline strength. Sensation intact bilaterally.  SKIN: No itching, rashes  MUSCULOSKELETAL: No joint pain or swelling; No muscle, back, or extremity pain

## 2023-09-17 NOTE — ED PROVIDER NOTE - PROGRESS NOTE DETAILS
Patient stable.  Resting comfortably.  Declines any pain medication.  IV antibiotics infusing.  No fever or elevated white count.  Spoke with Dr. Newton, kindly accepts patient for admission

## 2023-09-17 NOTE — H&P ADULT - PROBLEM SELECTOR PLAN 1
- 9/8/23 UCx >100,000 CFU/ml Klebsiella pneumoniae ESBL; ertapenem sensitive (S <0.5)  - S/P 1 L NS, Ertapenem 1 g   - Labs significant for Lactate 0.9, WBC 4.6,  BUN/Cr 16/0.59, K 3.7  - U/A: Moderate Leuk esterase, negative nitrite, WBC 25, Many bacteria  - c/w ertapenem 1gm daily  - f/u BCx x2, UCx  - consult ID, f/u recs

## 2023-09-17 NOTE — ED PROVIDER NOTE - PRIOR OUTPATIENT LAB SUMMARY
Urine culture resulted from September 8 shows Klebsiella pneumonia ESBL with multiple resistant antibiotics.  Show susceptibility to amikacin, ertapenem, imipenem, meropenem

## 2023-09-18 ENCOUNTER — TRANSCRIPTION ENCOUNTER (OUTPATIENT)
Age: 68
End: 2023-09-18

## 2023-09-18 DIAGNOSIS — K59.00 CONSTIPATION, UNSPECIFIED: ICD-10-CM

## 2023-09-18 LAB
ALBUMIN SERPL ELPH-MCNC: 3.4 G/DL — SIGNIFICANT CHANGE UP (ref 3.3–5)
ALP SERPL-CCNC: 59 U/L — SIGNIFICANT CHANGE UP (ref 40–120)
ALT FLD-CCNC: 33 U/L — SIGNIFICANT CHANGE UP (ref 12–78)
ANION GAP SERPL CALC-SCNC: 7 MMOL/L — SIGNIFICANT CHANGE UP (ref 5–17)
AST SERPL-CCNC: 21 U/L — SIGNIFICANT CHANGE UP (ref 15–37)
BASOPHILS # BLD AUTO: 0.06 K/UL — SIGNIFICANT CHANGE UP (ref 0–0.2)
BASOPHILS NFR BLD AUTO: 1.4 % — SIGNIFICANT CHANGE UP (ref 0–2)
BILIRUB SERPL-MCNC: 0.8 MG/DL — SIGNIFICANT CHANGE UP (ref 0.2–1.2)
BUN SERPL-MCNC: 11 MG/DL — SIGNIFICANT CHANGE UP (ref 7–23)
CALCIUM SERPL-MCNC: 9.1 MG/DL — SIGNIFICANT CHANGE UP (ref 8.5–10.1)
CHLORIDE SERPL-SCNC: 109 MMOL/L — HIGH (ref 96–108)
CO2 SERPL-SCNC: 24 MMOL/L — SIGNIFICANT CHANGE UP (ref 22–31)
CREAT SERPL-MCNC: 0.59 MG/DL — SIGNIFICANT CHANGE UP (ref 0.5–1.3)
EGFR: 99 ML/MIN/1.73M2 — SIGNIFICANT CHANGE UP
EOSINOPHIL # BLD AUTO: 0.12 K/UL — SIGNIFICANT CHANGE UP (ref 0–0.5)
EOSINOPHIL NFR BLD AUTO: 2.9 % — SIGNIFICANT CHANGE UP (ref 0–6)
GLUCOSE SERPL-MCNC: 86 MG/DL — SIGNIFICANT CHANGE UP (ref 70–99)
HCT VFR BLD CALC: 39.3 % — SIGNIFICANT CHANGE UP (ref 34.5–45)
HCV AB S/CO SERPL IA: 0.15 S/CO — SIGNIFICANT CHANGE UP (ref 0–0.99)
HCV AB SERPL-IMP: SIGNIFICANT CHANGE UP
HGB BLD-MCNC: 13.2 G/DL — SIGNIFICANT CHANGE UP (ref 11.5–15.5)
IMM GRANULOCYTES NFR BLD AUTO: 0.2 % — SIGNIFICANT CHANGE UP (ref 0–0.9)
LYMPHOCYTES # BLD AUTO: 0.94 K/UL — LOW (ref 1–3.3)
LYMPHOCYTES # BLD AUTO: 22.7 % — SIGNIFICANT CHANGE UP (ref 13–44)
MCHC RBC-ENTMCNC: 31.3 PG — SIGNIFICANT CHANGE UP (ref 27–34)
MCHC RBC-ENTMCNC: 33.6 GM/DL — SIGNIFICANT CHANGE UP (ref 32–36)
MCV RBC AUTO: 93.1 FL — SIGNIFICANT CHANGE UP (ref 80–100)
MONOCYTES # BLD AUTO: 0.34 K/UL — SIGNIFICANT CHANGE UP (ref 0–0.9)
MONOCYTES NFR BLD AUTO: 8.2 % — SIGNIFICANT CHANGE UP (ref 2–14)
NEUTROPHILS # BLD AUTO: 2.67 K/UL — SIGNIFICANT CHANGE UP (ref 1.8–7.4)
NEUTROPHILS NFR BLD AUTO: 64.6 % — SIGNIFICANT CHANGE UP (ref 43–77)
NRBC # BLD: 0 /100 WBCS — SIGNIFICANT CHANGE UP (ref 0–0)
PLATELET # BLD AUTO: 236 K/UL — SIGNIFICANT CHANGE UP (ref 150–400)
POTASSIUM SERPL-MCNC: 3.8 MMOL/L — SIGNIFICANT CHANGE UP (ref 3.5–5.3)
POTASSIUM SERPL-SCNC: 3.8 MMOL/L — SIGNIFICANT CHANGE UP (ref 3.5–5.3)
PROT SERPL-MCNC: 7.1 G/DL — SIGNIFICANT CHANGE UP (ref 6–8.3)
RBC # BLD: 4.22 M/UL — SIGNIFICANT CHANGE UP (ref 3.8–5.2)
RBC # FLD: 13.1 % — SIGNIFICANT CHANGE UP (ref 10.3–14.5)
SODIUM SERPL-SCNC: 140 MMOL/L — SIGNIFICANT CHANGE UP (ref 135–145)
WBC # BLD: 4.14 K/UL — SIGNIFICANT CHANGE UP (ref 3.8–10.5)
WBC # FLD AUTO: 4.14 K/UL — SIGNIFICANT CHANGE UP (ref 3.8–10.5)

## 2023-09-18 PROCEDURE — 99233 SBSQ HOSP IP/OBS HIGH 50: CPT | Mod: GC

## 2023-09-18 PROCEDURE — 99232 SBSQ HOSP IP/OBS MODERATE 35: CPT

## 2023-09-18 RX ORDER — FLUCONAZOLE 150 MG/1
150 TABLET ORAL ONCE
Refills: 0 | Status: COMPLETED | OUTPATIENT
Start: 2023-09-18 | End: 2023-09-18

## 2023-09-18 RX ORDER — LANOLIN ALCOHOL/MO/W.PET/CERES
10 CREAM (GRAM) TOPICAL AT BEDTIME
Refills: 0 | Status: DISCONTINUED | OUTPATIENT
Start: 2023-09-18 | End: 2023-09-20

## 2023-09-18 RX ORDER — SACCHAROMYCES BOULARDII 250 MG
250 POWDER IN PACKET (EA) ORAL
Refills: 0 | Status: DISCONTINUED | OUTPATIENT
Start: 2023-09-18 | End: 2023-09-20

## 2023-09-18 RX ORDER — FAMOTIDINE 10 MG/ML
20 INJECTION INTRAVENOUS AT BEDTIME
Refills: 0 | Status: DISCONTINUED | OUTPATIENT
Start: 2023-09-18 | End: 2023-09-20

## 2023-09-18 RX ORDER — POLYETHYLENE GLYCOL 3350 17 G/17G
17 POWDER, FOR SOLUTION ORAL DAILY
Refills: 0 | Status: DISCONTINUED | OUTPATIENT
Start: 2023-09-18 | End: 2023-09-20

## 2023-09-18 RX ORDER — FAMOTIDINE 10 MG/ML
20 INJECTION INTRAVENOUS DAILY
Refills: 0 | Status: DISCONTINUED | OUTPATIENT
Start: 2023-09-18 | End: 2023-09-18

## 2023-09-18 RX ADMIN — FAMOTIDINE 20 MILLIGRAM(S): 10 INJECTION INTRAVENOUS at 22:02

## 2023-09-18 RX ADMIN — Medication 650 MILLIGRAM(S): at 00:07

## 2023-09-18 RX ADMIN — Medication 10 MILLIGRAM(S): at 22:02

## 2023-09-18 RX ADMIN — POLYETHYLENE GLYCOL 3350 17 GRAM(S): 17 POWDER, FOR SOLUTION ORAL at 12:25

## 2023-09-18 RX ADMIN — FLUCONAZOLE 150 MILLIGRAM(S): 150 TABLET ORAL at 17:45

## 2023-09-18 RX ADMIN — Medication 250 MILLIGRAM(S): at 17:45

## 2023-09-18 RX ADMIN — ENOXAPARIN SODIUM 40 MILLIGRAM(S): 100 INJECTION SUBCUTANEOUS at 20:45

## 2023-09-18 RX ADMIN — ERTAPENEM SODIUM 120 MILLIGRAM(S): 1 INJECTION, POWDER, LYOPHILIZED, FOR SOLUTION INTRAMUSCULAR; INTRAVENOUS at 12:24

## 2023-09-18 NOTE — PROGRESS NOTE ADULT - PROBLEM SELECTOR PLAN 5
Chronic  - started polyethylene glycol QD Chronic  - started bowel regimen with polyethylene glycol QD

## 2023-09-18 NOTE — DISCHARGE NOTE PROVIDER - NSDCACTIVITY_GEN_ALL_CORE
Return to Work/School allowed/Stairs allowed/Walking - Indoors allowed/No heavy lifting/straining/Walking - Outdoors allowed Stairs allowed/Walking - Indoors allowed/No heavy lifting/straining/Walking - Outdoors allowed

## 2023-09-18 NOTE — PROGRESS NOTE ADULT - ASSESSMENT
66 yo female with history of hypertension and insomnia, presents with dysuria x 1 month, with recent urine culture of multidrug resistant ESBL Klebisella. Admitted for IV antibiotics for UTI 2/2 ESBL Klebsiella.  66yo F with PMH of hypertension and insomnia, presents with dysuria x 1 month, with recent urine culture growing multidrug resistant ESBL Klebsiella admitted for IV antibiotics for ESBL Klebsiella UTI.

## 2023-09-18 NOTE — DISCHARGE NOTE PROVIDER - ATTENDING DISCHARGE PHYSICAL EXAMINATION:
GENERAL: patient appears well, no acute distress, appropriately interactive  EYES: sclera clear, no exudates  NECK: soft, no JVD  LUNGS: good air entry bilaterally, clear to auscultation, symmetric breath sounds, no wheezing or rhonchi appreciated  HEART: soft S1/S2, regular rate and rhythm, no murmurs noted, no lower extremity edema  GASTROINTESTINAL:  abdomen is soft, nondistended, normoactive bowel sounds.  EXTREMITIES: +trace LE edema b/l.  L. knee midline healed surgical scar.  MUSCULOSKELETAL: no clubbing or cyanosis, no obvious deformity  NEUROLOGIC: awake, alert, oriented x3.

## 2023-09-18 NOTE — PROGRESS NOTE ADULT - PROBLEM SELECTOR PLAN 2
Chronic  - BP elevated on admission, 137/81 -> 158/77  - pt took 1 dose irbesartan this AM  - restarted home dose HCTZ with improved BUN:Cr ratio, originally held in setting of dehydration with elevated BUN:Cr Chronic  - BP elevated on admission, 137/81 -> 158/77  - c/w losartan  - restarted home dose HCTZ, originally held in setting of suspected dehydration

## 2023-09-18 NOTE — DISCHARGE NOTE PROVIDER - NSDCMRMEDTOKEN_GEN_ALL_CORE_FT
famotidine 20 mg oral tablet: 1 orally once a day  hydroCHLOROthiazide 25 mg oral tablet: 1 orally once a day  irbesartan 150 mg oral tablet: 1 orally once a day  melatonin 5 mg oral tablet: 1 orally once a day  Probiotic Formula oral capsule: 1 orally once a day   ertapenem 1 g injection: 1 injectable once a day 1g injection once per day with last dose on september 26  famotidine 20 mg oral tablet: 1 orally once a day  hydroCHLOROthiazide 25 mg oral tablet: 1 orally once a day  irbesartan 150 mg oral tablet: 1 orally once a day  melatonin 5 mg oral tablet: 1 orally once a day  Probiotic Formula oral capsule: 1 orally once a day   famotidine 20 mg oral tablet: 1 orally once a day  hydroCHLOROthiazide 25 mg oral tablet: 1 orally once a day  Invanz 1 g injection: 1 gram(s) injectable once a day 1g injection daily for 6 days. last injection to be given on 9/26.  irbesartan 150 mg oral tablet: 1 orally once a day  melatonin 5 mg oral tablet: 1 orally once a day  Probiotic Formula oral capsule: 1 orally once a day   famotidine 20 mg oral tablet: 1 orally once a day  hydroCHLOROthiazide 25 mg oral tablet: 1 orally once a day  Invanz 1 g injection: 1 gram(s) injectable once a day 1g injection daily for 6 days. last injection to be given on 9/26.  irbesartan 150 mg oral tablet: 1 orally once a day  melatonin 5 mg oral tablet: 1 orally once a day  Probiotic Formula oral capsule: 1 capsule orally 2 times a day

## 2023-09-18 NOTE — DISCHARGE NOTE PROVIDER - HOSPITAL COURSE
FROM ADMISSION H+P:   HPI:  67-year-old female with history of HTN, insomnia, GERD presents with continued dysuria, frequency, and urgency for the past month.  Patient reports a new sexual partner in June 2023.  Developed a UTI.  Initially prescribed Macrobid and then switched to Cipro.  Developed another UTI in August, prescribed Cipro and then changed to Keflex.  States urinary symptoms have continued despite treatment.  Saw a urologist 2 months ago and was told urine cultures show multiple resistance and to follow up with Infectious Disease. Pt received UCx report from PCP demonstrating ESBL Klebsiella pneumoniae. Went to urgent care this morning where she was told to come to emergency room. Endorses suprapubic fullness/discmofort, dysuria and urgency with urination. Patient denies any abdominal pain, flank pain, nausea, vomiting, fever, body aches.    ED course:  T 98.6, HR 79, /81, RR 16, SpO2 96% RA  S/P 1 L NS, Ertapenem 1 g   EKG: NSR, VR 71 bpm  Labs significant for Lactate 0.9, WBC 4.6,  BUN/Cr 16/0.59, K 3.7  U/A: Moderate Leuk esterase, negative nitrite, WBC 25, Many bacteria  COVID neg  Imaging:  CXR: unremarkable on wet read, FU official read (17 Sep 2023 14:17)      ---  HOSPITAL COURSE/PERTINENT LABS/PROCEDURES PERFORMED/PENDING TESTS: Patient was admitted for UTI. Patient had history of previous urine culture  on 9/8/23 showing >100,000 CFU/ml Klebsiella pneumoniae ESBL; ertapenem sensitive (S <0.5). Infectious disease saw the patient and recommended continuing Ertapenem. Blood cultures draw on admission grew ______.   Patient's home medications for were continued inpatient. HCTZ was held in setting of dehydration.   Physical therapy evaluated the patient and recommended -----. Social work/case management followed the patient case. Patient is stable for discharge to [[place],[facility]].       ---  PATIENT CONDITION:  - stable    ---  Vitals:    PHYSICAL EXAM ON DAY OF DISCHARGE:    ---  CONSULTANTS:     ---  ADVANCED CARE PLANNING:  - Code status:      - MOLST completed:      [  ] NO     [  ] YES    ---  TIME SPENT:  I, the attending physician, was physically present for the key portions of the evaluation and management (E/M) service provided. The total amount of time spent reviewing the hospital notes, laboratory values, imaging findings, assessing/counseling the patient, discussing with consultant physicians, social work, nursing staff was -- minutes FROM ADMISSION H+P:   HPI:  67-year-old female with history of HTN, insomnia, GERD presents with continued dysuria, frequency, and urgency for the past month.  Patient reports a new sexual partner in June 2023.  Developed a UTI.  Initially prescribed Macrobid and then switched to Cipro.  Developed another UTI in August, prescribed Cipro and then changed to Keflex.  States urinary symptoms have continued despite treatment.  Saw a urologist 2 months ago and was told urine cultures show multiple resistance and to follow up with Infectious Disease. Pt received UCx report from PCP demonstrating ESBL Klebsiella pneumoniae. Went to urgent care this morning where she was told to come to emergency room. Endorses suprapubic fullness/discmofort, dysuria and urgency with urination. Patient denies any abdominal pain, flank pain, nausea, vomiting, fever, body aches.    ED course:  T 98.6, HR 79, /81, RR 16, SpO2 96% RA  S/P 1 L NS, Ertapenem 1 g   EKG: NSR, VR 71 bpm  Labs significant for Lactate 0.9, WBC 4.6,  BUN/Cr 16/0.59, K 3.7  U/A: Moderate Leuk esterase, negative nitrite, WBC 25, Many bacteria  COVID neg  Imaging:  CXR: unremarkable on wet read, FU official read (17 Sep 2023 14:17)      ---  HOSPITAL COURSE/PERTINENT LABS/PROCEDURES PERFORMED/PENDING TESTS: Patient was admitted for UTI. Patient had history of previous urine culture  on 9/8/23 showing >100,000 CFU/ml Klebsiella pneumoniae ESBL; ertapenem sensitive (S <0.5). Infectious disease saw the patient and recommended continuing Ertapenem. Blood cultures draw on admission grew ______.   Patient's home medications for were continued inpatient. HCTZ was held in setting of dehydration.   Physical therapy evaluated the patient and recommended -----. Social work/case management followed the patient case. Patient is stable for discharge to [[place],[facility]       ---  PATIENT CONDITION:  - stable    ---  Vitals:    PHYSICAL EXAM ON DAY OF DISCHARGE:    ---  CONSULTANTS:   SEMAJ Jarvis    ---  ADVANCED CARE PLANNING:  - Code status:      - MOLST completed:      [  ] NO     [  ] YES    ---  TIME SPENT:  I, the attending physician, was physically present for the key portions of the evaluation and management (E/M) service provided. The total amount of time spent reviewing the hospital notes, laboratory values, imaging findings, assessing/counseling the patient, discussing with consultant physicians, social work, nursing staff was -- minutes FROM ADMISSION H+P:   HPI:  67-year-old female with history of HTN, insomnia, GERD presents with continued dysuria, frequency, and urgency for the past month.  Patient reports a new sexual partner in June 2023.  Developed a UTI.  Initially prescribed Macrobid and then switched to Cipro.  Developed another UTI in August, prescribed Cipro and then changed to Keflex.  States urinary symptoms have continued despite treatment.  Saw a urologist 2 months ago and was told urine cultures show multiple resistance and to follow up with Infectious Disease. Pt received UCx report from PCP demonstrating ESBL Klebsiella pneumoniae. Went to urgent care this morning where she was told to come to emergency room. Endorses suprapubic fullness/discomfort, dysuria and urgency with urination. Patient denies any abdominal pain, flank pain, nausea, vomiting, fever, body aches.    ED course:  T 98.6, HR 79, /81, RR 16, SpO2 96% RA  S/P 1 L NS, Ertapenem 1 g   EKG: NSR, VR 71 bpm  Labs significant for Lactate 0.9, WBC 4.6,  BUN/Cr 16/0.59, K 3.7  U/A: Moderate Leuk esterase, negative nitrite, WBC 25, Many bacteria  COVID neg  Imaging:  CXR: unremarkable on wet read, FU official read (17 Sep 2023 14:17)      ---  HOSPITAL COURSE/PERTINENT LABS/PROCEDURES PERFORMED/PENDING TESTS: Patient was admitted for UTI. Patient had history of previous urine culture  on 9/8/23 showing >100,000 CFU/ml Klebsiella pneumoniae ESBL; ertapenem sensitive (S <0.5). Infectious disease saw the patient and recommended continuing Ertapenem. Blood cultures draw on admission grew ______.**   Patient's home medications for were continued inpatient. HCTZ was held in setting of dehydration and then re-started. Patient endorsing urinary frequency, burning with urination, suprapubic fullness. Denies flank pain, denies hematuria.  Physical therapy evaluated the patient and recommended -----. Social work/case management followed the patient case. Patient is stable for discharge to [[place],[facility]].        ---  T(C): 36.3 (09-18-23 @ 04:56), Max: 36.9 (09-17-23 @ 16:16)  HR: 63 (09-18-23 @ 04:56) (63 - 82)  BP: 150/93 (09-18-23 @ 04:56) (127/73 - 175/77)  RR: 18 (09-18-23 @ 04:56) (18 - 18)  SpO2: 95% (09-18-23 @ 04:56) (93% - 96%)    GENERAL: patient appears well, no acute distress, appropriately interactive  EYES: sclera clear, no exudates  NECK: soft, no JVD  LUNGS: good air entry bilaterally, clear to auscultation, symmetric breath sounds, no wheezing or rhonchi appreciated  HEART: soft S1/S2, regular rate and rhythm, no murmurs noted, no lower extremity edema  GASTROINTESTINAL: +suprapubic tenderness. abdomen is soft, nondistended, normoactive bowel sounds.  EXTREMITIES: +1 LE edema b/l.  L. knee midline healed surgical scar.  MUSCULOSKELETAL: no clubbing or cyanosis, no obvious deformity  NEUROLOGIC: awake, alert, oriented x3.    ---  CONSULTANTS:   SEMAJ Jarvis    ---  ADVANCED CARE PLANNING:  - Code status:      - MOLST completed:      [  ] NO     [  ] YES    ---  TIME SPENT:  I, the attending physician, was physically present for the key portions of the evaluation and management (E/M) service provided. The total amount of time spent reviewing the hospital notes, laboratory values, imaging findings, assessing/counseling the patient, discussing with consultant physicians, social work, nursing staff was -- minutes FROM ADMISSION H+P:   HPI:  67-year-old female with history of HTN, insomnia, GERD presents with continued dysuria, frequency, and urgency for the past month.  Patient reports a new sexual partner in June 2023.  Developed a UTI.  Initially prescribed Macrobid and then switched to Cipro.  Developed another UTI in August, prescribed Cipro and then changed to Keflex.  States urinary symptoms have continued despite treatment.  Saw a urologist 2 months ago and was told urine cultures show multiple resistance and to follow up with Infectious Disease. Pt received UCx report from PCP demonstrating ESBL Klebsiella pneumoniae. Went to urgent care this morning where she was told to come to emergency room. Endorses suprapubic fullness/discomfort, dysuria and urgency with urination. Patient denies any abdominal pain, flank pain, nausea, vomiting, fever, body aches.    ED course:  T 98.6, HR 79, /81, RR 16, SpO2 96% RA  S/P 1 L NS, Ertapenem 1 g   EKG: NSR, VR 71 bpm  Labs significant for Lactate 0.9, WBC 4.6,  BUN/Cr 16/0.59, K 3.7  U/A: Moderate Leuk esterase, negative nitrite, WBC 25, Many bacteria  COVID neg  Imaging:  CXR: unremarkable on wet read, FU official read (17 Sep 2023 14:17)      ---  HOSPITAL COURSE/PERTINENT LABS/PROCEDURES PERFORMED/PENDING TESTS: Patient was admitted for UTI. Patient had history of previous urine culture  on 9/8/23 showing >100,000 CFU/ml Klebsiella pneumoniae ESBL; ertapenem sensitive (S <0.5). Infectious disease saw the patient and recommended continuing Ertapenem. Blood cultures draw on admission grew ______.** Urine cultures drawn and grew ____*   Patient's home medications for were continued inpatient. HCTZ was held in setting of dehydration and then re-started. Patient endorsing urinary frequency, burning with urination, suprapubic fullness. Denies flank pain, denies hematuria.  Physical therapy evaluated the patient and recommended -----. Social work/case management followed the patient case. Patient is stable for discharge to [[place],[facility]].        ---  T(C): 36.3 (09-18-23 @ 04:56), Max: 36.9 (09-17-23 @ 16:16)  HR: 63 (09-18-23 @ 04:56) (63 - 82)  BP: 150/93 (09-18-23 @ 04:56) (127/73 - 175/77)  RR: 18 (09-18-23 @ 04:56) (18 - 18)  SpO2: 95% (09-18-23 @ 04:56) (93% - 96%)    GENERAL: patient appears well, no acute distress, appropriately interactive  EYES: sclera clear, no exudates  NECK: soft, no JVD  LUNGS: good air entry bilaterally, clear to auscultation, symmetric breath sounds, no wheezing or rhonchi appreciated  HEART: soft S1/S2, regular rate and rhythm, no murmurs noted, no lower extremity edema  GASTROINTESTINAL: +suprapubic tenderness. abdomen is soft, nondistended, normoactive bowel sounds.  EXTREMITIES: +1 LE edema b/l.  L. knee midline healed surgical scar.  MUSCULOSKELETAL: no clubbing or cyanosis, no obvious deformity  NEUROLOGIC: awake, alert, oriented x3.    ---  CONSULTANTS:   SEMAJ Jarvis    ---  ADVANCED CARE PLANNING:  - Code status:      - MOLST completed:      [  ] NO     [  ] YES    ---  TIME SPENT:  I, the attending physician, was physically present for the key portions of the evaluation and management (E/M) service provided. The total amount of time spent reviewing the hospital notes, laboratory values, imaging findings, assessing/counseling the patient, discussing with consultant physicians, social work, nursing staff was -- minutes FROM ADMISSION H+P:   HPI:  67-year-old female with history of HTN, insomnia, GERD presents with continued dysuria, frequency, and urgency for the past month.  Patient reports a new sexual partner in June 2023.  Developed a UTI.  Initially prescribed Macrobid and then switched to Cipro.  Developed another UTI in August, prescribed Cipro and then changed to Keflex.  States urinary symptoms have continued despite treatment.  Saw a urologist 2 months ago and was told urine cultures show multiple resistance and to follow up with Infectious Disease. Pt received UCx report from PCP demonstrating ESBL Klebsiella pneumoniae. Went to urgent care this morning where she was told to come to emergency room. Endorses suprapubic fullness/discomfort, dysuria and urgency with urination. Patient denies any abdominal pain, flank pain, nausea, vomiting, fever, body aches.    ED course:  T 98.6, HR 79, /81, RR 16, SpO2 96% RA  S/P 1 L NS, Ertapenem 1 g   EKG: NSR, VR 71 bpm  Labs significant for Lactate 0.9, WBC 4.6,  BUN/Cr 16/0.59, K 3.7  U/A: Moderate Leuk esterase, negative nitrite, WBC 25, Many bacteria  COVID neg  Imaging:  CXR: unremarkable on wet read, FU official read (17 Sep 2023 14:17)      ---  HOSPITAL COURSE/PERTINENT LABS/PROCEDURES PERFORMED/PENDING TESTS: Patient was admitted for UTI. Patient had history of previous urine culture  on 9/8/23 showing >100,000 CFU/ml Klebsiella pneumoniae ESBL; ertapenem sensitive (S <0.5). Infectious disease saw the patient and recommended continuing Ertapenem. Blood cultures drawn on admission 9/17 showed no growth. Urine cultures drawn and grew >100,000 gram negative rods. Patient's home medications for were continued inpatient. HCTZ was held in setting of dehydration and then re-started. Patient underwent midline placement with interventional radiology on 9/20.   Case management followed the patient case. Patient is stable for discharge to home.       ---  Vital Signs Last 24 Hrs  T(C): 36.4 (20 Sep 2023 04:46), Max: 36.7 (19 Sep 2023 20:44)  T(F): 97.5 (20 Sep 2023 04:46), Max: 98 (19 Sep 2023 20:44)  HR: 73 (20 Sep 2023 04:46) (71 - 78)  BP: 122/79 (20 Sep 2023 04:46) (122/79 - 149/57)  BP(mean): --  RR: 18 (20 Sep 2023 04:46) (18 - 18)  SpO2: 94% (20 Sep 2023 04:46) (94% - 97%)    Parameters below as of 20 Sep 2023 04:46  Patient On (Oxygen Delivery Method): room air      PHYSICAL EXAM ON DAY OF DISCHARGE:  GENERAL: patient appears well, no acute distress, appropriately interactive  EYES: sclera clear, no exudates  NECK: soft, no JVD  LUNGS: good air entry bilaterally, clear to auscultation, symmetric breath sounds, no wheezing or rhonchi appreciated  HEART: soft S1/S2, regular rate and rhythm, no murmurs noted, no lower extremity edema  GASTROINTESTINAL: +suprapubic tenderness. abdomen is soft, nondistended, normoactive bowel sounds.  EXTREMITIES: +1 LE edema b/l.  L. knee midline healed surgical scar.  MUSCULOSKELETAL: no clubbing or cyanosis, no obvious deformity  NEUROLOGIC: awake, alert, oriented x3.    ---  CONSULTANTS:   Herbert Benito    ---  ADVANCED CARE PLANNING:  - Code status:      - MOLST completed:      [  ] NO     [  ] YES    ---  TIME SPENT:  I, the attending physician, was physically present for the key portions of the evaluation and management (E/M) service provided. The total amount of time spent reviewing the hospital notes, laboratory values, imaging findings, assessing/counseling the patient, discussing with consultant physicians, social work, nursing staff was -- minutes FROM ADMISSION H+P:   HPI:  67-year-old female with history of HTN, insomnia, GERD presents with continued dysuria, frequency, and urgency for the past month.  Patient reports a new sexual partner in June 2023.  Developed a UTI.  Initially prescribed Macrobid and then switched to Cipro.  Developed another UTI in August, prescribed Cipro and then changed to Keflex.  States urinary symptoms have continued despite treatment.  Saw a urologist 2 months ago and was told urine cultures show multiple resistance and to follow up with Infectious Disease. Pt received UCx report from PCP demonstrating ESBL Klebsiella pneumoniae. Went to urgent care this morning where she was told to come to emergency room. Endorses suprapubic fullness/discomfort, dysuria and urgency with urination. Patient denies any abdominal pain, flank pain, nausea, vomiting, fever, body aches.    ED course:  T 98.6, HR 79, /81, RR 16, SpO2 96% RA  S/P 1 L NS, Ertapenem 1 g   EKG: NSR, VR 71 bpm  Labs significant for Lactate 0.9, WBC 4.6,  BUN/Cr 16/0.59, K 3.7  U/A: Moderate Leuk esterase, negative nitrite, WBC 25, Many bacteria  COVID neg  Imaging:  CXR: unremarkable on wet read, FU official read (17 Sep 2023 14:17)      ---  HOSPITAL COURSE/PERTINENT LABS/PROCEDURES PERFORMED/PENDING TESTS: Patient was admitted for UTI. Patient had history of previous urine culture  on 9/8/23 showing >100,000 CFU/ml Klebsiella pneumoniae ESBL; ertapenem sensitive (S <0.5). Infectious disease saw the patient and recommended continuing Ertapenem. Blood cultures drawn on admission 9/17 showed no growth. Urine cultures drawn and grew >100,000 gram negative rods. Patient's home medications for were continued inpatient. HCTZ was held in setting of dehydration and then re-started. Patient underwent midline placement with interventional radiology on 9/20.   Case management followed the patient case. Patient is stable for discharge to home.       ---  Vital Signs Last 24 Hrs  T(C): 36.4 (20 Sep 2023 04:46), Max: 36.7 (19 Sep 2023 20:44)  T(F): 97.5 (20 Sep 2023 04:46), Max: 98 (19 Sep 2023 20:44)  HR: 73 (20 Sep 2023 04:46) (71 - 78)  BP: 122/79 (20 Sep 2023 04:46) (122/79 - 149/57)  BP(mean): --  RR: 18 (20 Sep 2023 04:46) (18 - 18)  SpO2: 94% (20 Sep 2023 04:46) (94% - 97%)    Parameters below as of 20 Sep 2023 04:46  Patient On (Oxygen Delivery Method): room air      PHYSICAL EXAM ON DAY OF DISCHARGE:  GENERAL: patient appears well, no acute distress, appropriately interactive  EYES: sclera clear, no exudates  NECK: soft, no JVD  LUNGS: good air entry bilaterally, clear to auscultation, symmetric breath sounds, no wheezing or rhonchi appreciated  HEART: soft S1/S2, regular rate and rhythm, no murmurs noted, no lower extremity edema  GASTROINTESTINAL:  abdomen is soft, nondistended, normoactive bowel sounds.  EXTREMITIES: +trace LE edema b/l.  L. knee midline healed surgical scar.  MUSCULOSKELETAL: no clubbing or cyanosis, no obvious deformity  NEUROLOGIC: awake, alert, oriented x3.    ---  CONSULTANTS:   Herbert Benito      ---  TIME SPENT:  I, the attending physician, was physically present for the key portions of the evaluation and management (E/M) service provided. The total amount of time spent reviewing the hospital notes, laboratory values, imaging findings, assessing/counseling the patient, discussing with consultant physicians, social work, nursing staff was 40 minutes

## 2023-09-18 NOTE — DISCHARGE NOTE PROVIDER - NSDCCPCAREPLAN_GEN_ALL_CORE_FT
PRINCIPAL DISCHARGE DIAGNOSIS  Diagnosis: UTI (urinary tract infection)  Assessment and Plan of Treatment:      PRINCIPAL DISCHARGE DIAGNOSIS  Diagnosis: UTI (urinary tract infection)  Assessment and Plan of Treatment: You were diagnosed with a urinary tract infection. This occurs when bacteria enter the urinary tract and cause an infection. You were treated with anti-biotics throughout your hospital stay. Please continue with the Ertapenem anti-biotic dosed at 1g daily which you will inject into your midline catheter. Please continue to take the antibiotic until the last day of treamtment on 9/26. Follow up with your primary care doctor within 1-2 weeks of discharge.

## 2023-09-18 NOTE — PATIENT CHOICE NOTE. - NSPTCHOICENOTES_GEN_ALL_CORE
D/C resource folder provided at bedside this includes lists for both rehab facilities and home care agencies

## 2023-09-18 NOTE — PROGRESS NOTE ADULT - SUBJECTIVE AND OBJECTIVE BOX
Patient is a 67y old  Female who presents with a chief complaint of ESBL Klebisella UTI (18 Sep 2023 08:52)      Subjective:  INTERVAL HPI/OVERNIGHT EVENTS: Patient seen and examined at bedside. No acute overnight events occurred. Patient stated she went to the bathroom 20+ times  to urinate throughout the night and was experiencing 8/10 pain. Patient was given Tylenol which helped her pain, but she was still in discomfort. Patient stated that it was the "worse it has ever been last night." She stated she has been drinking a lot of water and feels not enough urine is coming out, but she is able to urinate. She also stated she takes famotidine every night and was not given one last night, so she took one that she had in her purse because she was experiencing bad acid reflux. Patient also c/o bloating sensation, new cough this morning, dry mouth, mild headache, and chronic constipation. Denies fevers, chills, lightheadedness, chest pain, dyspnea, n/v/d, flank pain.    MEDICATIONS  (STANDING):  enoxaparin Injectable 40 milliGRAM(s) SubCutaneous every 24 hours  ertapenem  IVPB 1000 milliGRAM(s) IV Intermittent every 24 hours  famotidine    Tablet 20 milliGRAM(s) Oral at bedtime  hydrochlorothiazide 25 milliGRAM(s) Oral daily  influenza  Vaccine (HIGH DOSE) 0.7 milliLiter(s) IntraMuscular once  losartan 50 milliGRAM(s) Oral daily  melatonin 3 milliGRAM(s) Oral at bedtime  polyethylene glycol 3350 17 Gram(s) Oral daily  saccharomyces boulardii 250 milliGRAM(s) Oral two times a day  sodium chloride 0.9%. 1000 milliLiter(s) (60 mL/Hr) IV Continuous <Continuous>    MEDICATIONS  (PRN):  acetaminophen     Tablet .. 650 milliGRAM(s) Oral every 6 hours PRN Mild Pain (1 - 3)  acetaminophen     Tablet .. 650 milliGRAM(s) Oral every 6 hours PRN Temp greater or equal to 38C (100.4F)      Allergies    tape (Rash)  No Known Drug Allergies    Intolerances        REVIEW OF SYSTEMS:  CONSTITUTIONAL: No fever or chills  HEENT:  + headache, + dry mouth, no sore throat  RESPIRATORY: + cough, No shortness of breath  CARDIOVASCULAR: No chest pain, palpitations  GASTROINTESTINAL: + chronic constipation, + bloating, No abd pain, nausea, vomiting, or diarrhea  GENITOURINARY: + dysuria, + urgency, + frequency, + suprapubic pain, No hematuria or flank pain        Objective:  Vital Signs Last 24 Hrs  T(C): 36.3 (18 Sep 2023 04:56), Max: 36.9 (17 Sep 2023 16:16)  T(F): 97.4 (18 Sep 2023 04:56), Max: 98.5 (17 Sep 2023 16:16)  HR: 63 (18 Sep 2023 04:56) (63 - 82)  BP: 150/93 (18 Sep 2023 04:56) (127/73 - 175/77)  BP(mean): --  RR: 18 (18 Sep 2023 04:56) (18 - 18)  SpO2: 95% (18 Sep 2023 04:56) (93% - 96%)    Parameters below as of 18 Sep 2023 04:56  Patient On (Oxygen Delivery Method): room air        GENERAL: NAD, well appearing  HEENT: Moist mucous membranes, anicteric   CHEST/LUNG: Clear to auscultation bilaterally; No rales, rhonchi, wheezing, or rubs. Unlabored respirations  HEART: Regular rate and rhythm; +S1/S2, No murmurs, rubs, or gallops  ABDOMEN: Bowel sounds present; Soft, slight discomfort to palpation in all 4 quadrants   EXTREMITIES:  DP/PT 2+ Pulses, brisk capillary refill. 1+ LE edema  NERVOUS SYSTEM:  Alert & Oriented X3, speech clear. No deficits   : Suprapubic tenderness, No CVA tenderness      LABS:                        13.2   4.14  )-----------( 236      ( 18 Sep 2023 07:00 )             39.3     CBC Full  -  ( 18 Sep 2023 07:00 )  WBC Count : 4.14 K/uL  Hemoglobin : 13.2 g/dL  Hematocrit : 39.3 %  Platelet Count - Automated : 236 K/uL  Mean Cell Volume : 93.1 fl  Mean Cell Hemoglobin : 31.3 pg  Mean Cell Hemoglobin Concentration : 33.6 gm/dL  Auto Neutrophil # : 2.67 K/uL  Auto Lymphocyte # : 0.94 K/uL  Auto Monocyte # : 0.34 K/uL  Auto Eosinophil # : 0.12 K/uL  Auto Basophil # : 0.06 K/uL  Auto Neutrophil % : 64.6 %  Auto Lymphocyte % : 22.7 %  Auto Monocyte % : 8.2 %  Auto Eosinophil % : 2.9 %  Auto Basophil % : 1.4 %    18 Sep 2023 07:00    140    |  109    |  11     ----------------------------<  86     3.8     |  24     |  0.59     Ca    9.1        18 Sep 2023 07:00    TPro  7.1    /  Alb  3.4    /  TBili  0.8    /  DBili  x      /  AST  21     /  ALT  33     /  AlkPhos  59     18 Sep 2023 07:00    PT/INR - ( 17 Sep 2023 11:30 )   PT: 10.7 sec;   INR: 0.91 ratio         PTT - ( 17 Sep 2023 11:30 )  PTT:31.4 sec  Urinalysis Basic - ( 18 Sep 2023 07:00 )    Color: x / Appearance: x / SG: x / pH: x  Gluc: 86 mg/dL / Ketone: x  / Bili: x / Urobili: x   Blood: x / Protein: x / Nitrite: x   Leuk Esterase: x / RBC: x / WBC x   Sq Epi: x / Non Sq Epi: x / Bacteria: x      CAPILLARY BLOOD GLUCOSE        RADIOLOGY & ADDITIONAL TESTS:   < from: Xray Chest 1 View AP/PA (09.17.23 @ 12:29) >  INTERPRETATION:  Heart size and the mediastinum are not accurately evaluated on this image.  The trachea is midline.  The lungs are clear.  No pleural effusion or pneumothorax is seen.  There is scoliosis of the spine.  IMPRESSION:  Clear lungs.  < end of copied text >      Personally reviewed.     Consultant(s) Notes Reviewed:  [x] YES  [ ] NO     Patient is a 67y old  Female who presents with a chief complaint of ESBL Klebisella UTI (18 Sep 2023 08:52)      Subjective:  INTERVAL HPI/OVERNIGHT EVENTS: Patient seen and examined at bedside. No acute overnight events occurred. Patient stated she went to the bathroom 20+ times  to urinate throughout the night and was experiencing 8/10 pain. Patient was given Tylenol which helped her pain, but she was still in discomfort. Patient stated that it was the "worse it has ever been last night." She stated she has been drinking a lot of water and feels not enough urine is coming out, but she is able to urinate. She also stated she takes famotidine every night and was not given one last night, so she took one that she had in her purse because she was experiencing bad acid reflux. Patient also c/o bloating sensation, new cough this morning, dry mouth, mild headache, and chronic constipation that started with a new diet she implemented with intention of losing weight. Denies fevers, chills, chest pain, dyspnea, n/v/d, flank pain.    MEDICATIONS  (STANDING):  enoxaparin Injectable 40 milliGRAM(s) SubCutaneous every 24 hours  ertapenem  IVPB 1000 milliGRAM(s) IV Intermittent every 24 hours  famotidine    Tablet 20 milliGRAM(s) Oral at bedtime  hydrochlorothiazide 25 milliGRAM(s) Oral daily  influenza  Vaccine (HIGH DOSE) 0.7 milliLiter(s) IntraMuscular once  losartan 50 milliGRAM(s) Oral daily  melatonin 3 milliGRAM(s) Oral at bedtime  polyethylene glycol 3350 17 Gram(s) Oral daily  saccharomyces boulardii 250 milliGRAM(s) Oral two times a day  sodium chloride 0.9%. 1000 milliLiter(s) (60 mL/Hr) IV Continuous <Continuous>    MEDICATIONS  (PRN):  acetaminophen     Tablet .. 650 milliGRAM(s) Oral every 6 hours PRN Mild Pain (1 - 3)  acetaminophen     Tablet .. 650 milliGRAM(s) Oral every 6 hours PRN Temp greater or equal to 38C (100.4F)      Allergies    tape (Rash)  No Known Drug Allergies    Intolerances        REVIEW OF SYSTEMS:  CONSTITUTIONAL: No fever or chills  HEENT:  + headache, + dry mouth, no sore throat  RESPIRATORY: + cough, No shortness of breath  CARDIOVASCULAR: No chest pain, palpitations  GASTROINTESTINAL: + chronic constipation, + bloating, No abd pain, nausea, vomiting, or diarrhea  GENITOURINARY: + dysuria, + urgency, + frequency, + suprapubic pain, No hematuria or flank pain        Objective:  Vital Signs Last 24 Hrs  T(C): 36.3 (18 Sep 2023 04:56), Max: 36.9 (17 Sep 2023 16:16)  T(F): 97.4 (18 Sep 2023 04:56), Max: 98.5 (17 Sep 2023 16:16)  HR: 63 (18 Sep 2023 04:56) (63 - 82)  BP: 150/93 (18 Sep 2023 04:56) (127/73 - 175/77)  BP(mean): --  RR: 18 (18 Sep 2023 04:56) (18 - 18)  SpO2: 95% (18 Sep 2023 04:56) (93% - 96%)    Parameters below as of 18 Sep 2023 04:56  Patient On (Oxygen Delivery Method): room air        GENERAL: NAD, well appearing  HEENT: Moist mucous membranes, anicteric   CHEST/LUNG: Clear to auscultation bilaterally; No rales, rhonchi, wheezing, or rubs. Unlabored respirations  HEART: Regular rate and rhythm; +S1/S2, No murmurs, rubs, or gallops  ABDOMEN: Bowel sounds present; Soft, slight discomfort to palpation in all 4 quadrants   EXTREMITIES:  DP/PT 2+ Pulses, brisk capillary refill. 1+ LE edema  NERVOUS SYSTEM:  Alert & Oriented X3, speech clear. No deficits   : Suprapubic tenderness, No CVA tenderness      LABS:                        13.2   4.14  )-----------( 236      ( 18 Sep 2023 07:00 )             39.3     CBC Full  -  ( 18 Sep 2023 07:00 )  WBC Count : 4.14 K/uL  Hemoglobin : 13.2 g/dL  Hematocrit : 39.3 %  Platelet Count - Automated : 236 K/uL  Mean Cell Volume : 93.1 fl  Mean Cell Hemoglobin : 31.3 pg  Mean Cell Hemoglobin Concentration : 33.6 gm/dL  Auto Neutrophil # : 2.67 K/uL  Auto Lymphocyte # : 0.94 K/uL  Auto Monocyte # : 0.34 K/uL  Auto Eosinophil # : 0.12 K/uL  Auto Basophil # : 0.06 K/uL  Auto Neutrophil % : 64.6 %  Auto Lymphocyte % : 22.7 %  Auto Monocyte % : 8.2 %  Auto Eosinophil % : 2.9 %  Auto Basophil % : 1.4 %    18 Sep 2023 07:00    140    |  109    |  11     ----------------------------<  86     3.8     |  24     |  0.59     Ca    9.1        18 Sep 2023 07:00    TPro  7.1    /  Alb  3.4    /  TBili  0.8    /  DBili  x      /  AST  21     /  ALT  33     /  AlkPhos  59     18 Sep 2023 07:00    PT/INR - ( 17 Sep 2023 11:30 )   PT: 10.7 sec;   INR: 0.91 ratio         PTT - ( 17 Sep 2023 11:30 )  PTT:31.4 sec  Urinalysis Basic - ( 18 Sep 2023 07:00 )    Color: x / Appearance: x / SG: x / pH: x  Gluc: 86 mg/dL / Ketone: x  / Bili: x / Urobili: x   Blood: x / Protein: x / Nitrite: x   Leuk Esterase: x / RBC: x / WBC x   Sq Epi: x / Non Sq Epi: x / Bacteria: x      CAPILLARY BLOOD GLUCOSE        RADIOLOGY & ADDITIONAL TESTS:   < from: Xray Chest 1 View AP/PA (09.17.23 @ 12:29) >  INTERPRETATION:  Heart size and the mediastinum are not accurately evaluated on this image.  The trachea is midline.  The lungs are clear.  No pleural effusion or pneumothorax is seen.  There is scoliosis of the spine.  IMPRESSION:  Clear lungs.  < end of copied text >      Personally reviewed.     Consultant(s) Notes Reviewed:  [x] YES  [ ] NO     Patient is a 67y old  Female who presents with a chief complaint of ESBL Klebisella UTI (18 Sep 2023 08:52)      Subjective:  INTERVAL HPI/OVERNIGHT EVENTS: Patient seen and examined at bedside. No acute overnight events occurred. Patient stated she went to the bathroom 20+ times  to urinate throughout the night and was experiencing 8/10 pain. Patient was given Tylenol which helped her pain, but she was still in discomfort. Patient stated that it was the "worse it has ever been last night." She stated she has been drinking a lot of water and feels not enough urine is coming out, but she is able to urinate. She also stated she takes famotidine every night and was not given one last night, so she took one that she had in her purse because she was experiencing bad acid reflux. Patient also c/o bloating sensation, new cough this morning, dry mouth, mild headache, and chronic constipation that started with a new diet she implemented with intention of losing weight. Denies fevers, chills, chest pain, dyspnea, n/v/d, flank pain.    MEDICATIONS  (STANDING):  enoxaparin Injectable 40 milliGRAM(s) SubCutaneous every 24 hours  ertapenem  IVPB 1000 milliGRAM(s) IV Intermittent every 24 hours  famotidine    Tablet 20 milliGRAM(s) Oral at bedtime  hydrochlorothiazide 25 milliGRAM(s) Oral daily  influenza  Vaccine (HIGH DOSE) 0.7 milliLiter(s) IntraMuscular once  losartan 50 milliGRAM(s) Oral daily  melatonin 3 milliGRAM(s) Oral at bedtime  polyethylene glycol 3350 17 Gram(s) Oral daily  saccharomyces boulardii 250 milliGRAM(s) Oral two times a day  sodium chloride 0.9%. 1000 milliLiter(s) (60 mL/Hr) IV Continuous <Continuous>    MEDICATIONS  (PRN):  acetaminophen     Tablet .. 650 milliGRAM(s) Oral every 6 hours PRN Mild Pain (1 - 3)  acetaminophen     Tablet .. 650 milliGRAM(s) Oral every 6 hours PRN Temp greater or equal to 38C (100.4F)      Allergies    tape (Rash)  No Known Drug Allergies    Intolerances        REVIEW OF SYSTEMS:  CONSTITUTIONAL: No fever or chills  HEENT:  + headache, + dry mouth, no sore throat  RESPIRATORY: + cough, No shortness of breath  CARDIOVASCULAR: No chest pain, palpitations  GASTROINTESTINAL: + chronic constipation, + bloating, No abd pain, nausea, vomiting, or diarrhea  GENITOURINARY: + dysuria, + urgency, + frequency, + suprapubic pain, No hematuria or flank pain  NEURO: Denies blurry vision        Objective:  Vital Signs Last 24 Hrs  T(C): 36.3 (18 Sep 2023 04:56), Max: 36.9 (17 Sep 2023 16:16)  T(F): 97.4 (18 Sep 2023 04:56), Max: 98.5 (17 Sep 2023 16:16)  HR: 63 (18 Sep 2023 04:56) (63 - 82)  BP: 150/93 (18 Sep 2023 04:56) (127/73 - 175/77)  BP(mean): --  RR: 18 (18 Sep 2023 04:56) (18 - 18)  SpO2: 95% (18 Sep 2023 04:56) (93% - 96%)    Parameters below as of 18 Sep 2023 04:56  Patient On (Oxygen Delivery Method): room air        GENERAL: NAD, well appearing  HEENT: Moist mucous membranes, anicteric   CHEST/LUNG: Clear to auscultation bilaterally; No rales, rhonchi, wheezing, or rubs. Unlabored respirations  HEART: Regular rate and rhythm; +S1/S2, No murmurs, rubs, or gallops  ABDOMEN: Bowel sounds present; Soft, slight discomfort to palpation in all 4 quadrants   EXTREMITIES:  DP/PT 2+ Pulses, brisk capillary refill. 1+ LE edema  NERVOUS SYSTEM:  Alert & Oriented X3, speech clear. No deficits   : Suprapubic tenderness, No CVA tenderness      LABS:                        13.2   4.14  )-----------( 236      ( 18 Sep 2023 07:00 )             39.3     CBC Full  -  ( 18 Sep 2023 07:00 )  WBC Count : 4.14 K/uL  Hemoglobin : 13.2 g/dL  Hematocrit : 39.3 %  Platelet Count - Automated : 236 K/uL  Mean Cell Volume : 93.1 fl  Mean Cell Hemoglobin : 31.3 pg  Mean Cell Hemoglobin Concentration : 33.6 gm/dL  Auto Neutrophil # : 2.67 K/uL  Auto Lymphocyte # : 0.94 K/uL  Auto Monocyte # : 0.34 K/uL  Auto Eosinophil # : 0.12 K/uL  Auto Basophil # : 0.06 K/uL  Auto Neutrophil % : 64.6 %  Auto Lymphocyte % : 22.7 %  Auto Monocyte % : 8.2 %  Auto Eosinophil % : 2.9 %  Auto Basophil % : 1.4 %    18 Sep 2023 07:00    140    |  109    |  11     ----------------------------<  86     3.8     |  24     |  0.59     Ca    9.1        18 Sep 2023 07:00    TPro  7.1    /  Alb  3.4    /  TBili  0.8    /  DBili  x      /  AST  21     /  ALT  33     /  AlkPhos  59     18 Sep 2023 07:00    PT/INR - ( 17 Sep 2023 11:30 )   PT: 10.7 sec;   INR: 0.91 ratio         PTT - ( 17 Sep 2023 11:30 )  PTT:31.4 sec  Urinalysis Basic - ( 18 Sep 2023 07:00 )    Color: x / Appearance: x / SG: x / pH: x  Gluc: 86 mg/dL / Ketone: x  / Bili: x / Urobili: x   Blood: x / Protein: x / Nitrite: x   Leuk Esterase: x / RBC: x / WBC x   Sq Epi: x / Non Sq Epi: x / Bacteria: x      CAPILLARY BLOOD GLUCOSE        RADIOLOGY & ADDITIONAL TESTS:   < from: Xray Chest 1 View AP/PA (09.17.23 @ 12:29) >  INTERPRETATION:  Heart size and the mediastinum are not accurately evaluated on this image.  The trachea is midline.  The lungs are clear.  No pleural effusion or pneumothorax is seen.  There is scoliosis of the spine.  IMPRESSION:  Clear lungs.  < end of copied text >      Personally reviewed.     Consultant(s) Notes Reviewed:  [x] YES  [ ] NO     Patient is a 67y old  Female who presents with a chief complaint of ESBL Klebsiella UTI, dysuria.       Subjective:  INTERVAL HPI/OVERNIGHT EVENTS: Patient seen and examined at bedside. No acute overnight events occurred. Patient stated she went to the bathroom 20+ times  to urinate throughout the night and was experiencing 8/10 burning pain at times. Patient was given Tylenol which helped her pain, but she was still in discomfort. Patient stated that it was the "worse it has ever been last night." She stated she has been drinking a lot of water and feels not enough urine is coming out, but she is able to urinate. She also stated she takes famotidine every night and was not given one last night, so she took one that she had in her purse because she was experiencing acid reflux. Patient also c/o bloating sensation, occasional cough, dry mouth, mild headache, and chronic constipation that started with a new diet she implemented with intention of losing weight. Denies fevers, chills, chest pain, dyspnea, n/v/d, flank pain.    MEDICATIONS  (STANDING):  enoxaparin Injectable 40 milliGRAM(s) SubCutaneous every 24 hours  ertapenem  IVPB 1000 milliGRAM(s) IV Intermittent every 24 hours  famotidine    Tablet 20 milliGRAM(s) Oral at bedtime  hydrochlorothiazide 25 milliGRAM(s) Oral daily  influenza  Vaccine (HIGH DOSE) 0.7 milliLiter(s) IntraMuscular once  losartan 50 milliGRAM(s) Oral daily  melatonin 3 milliGRAM(s) Oral at bedtime  polyethylene glycol 3350 17 Gram(s) Oral daily  saccharomyces boulardii 250 milliGRAM(s) Oral two times a day  sodium chloride 0.9%. 1000 milliLiter(s) (60 mL/Hr) IV Continuous <Continuous>    MEDICATIONS  (PRN):  acetaminophen     Tablet .. 650 milliGRAM(s) Oral every 6 hours PRN Mild Pain (1 - 3)  acetaminophen     Tablet .. 650 milliGRAM(s) Oral every 6 hours PRN Temp greater or equal to 38C (100.4F)      Allergies    tape (Rash)  No Known Drug Allergies    Intolerances        REVIEW OF SYSTEMS:  CONSTITUTIONAL: No fever or chills  HEENT:  + mild headache, + dry mouth, no sore throat  RESPIRATORY: +occasional cough, No shortness of breath  CARDIOVASCULAR: No chest pain, palpitations  GASTROINTESTINAL: + chronic constipation, + bloating, No abd pain, nausea, vomiting, or diarrhea  GENITOURINARY: + dysuria, + urgency, + frequency, + suprapubic pain, No hematuria or flank pain  NEURO: Denies blurry vision        Objective:  Vital Signs Last 24 Hrs  T(C): 36.3 (18 Sep 2023 04:56), Max: 36.9 (17 Sep 2023 16:16)  T(F): 97.4 (18 Sep 2023 04:56), Max: 98.5 (17 Sep 2023 16:16)  HR: 63 (18 Sep 2023 04:56) (63 - 82)  BP: 150/93 (18 Sep 2023 04:56) (127/73 - 175/77)  BP(mean): --  RR: 18 (18 Sep 2023 04:56) (18 - 18)  SpO2: 95% (18 Sep 2023 04:56) (93% - 96%)    Parameters below as of 18 Sep 2023 04:56  Patient On (Oxygen Delivery Method): room air        GENERAL: NAD at rest  HEENT: Moist mucous membranes, anicteric   CHEST/LUNG: Clear to auscultation bilaterally; No rales, rhonchi, wheezing. Unlabored respirations  HEART: Regular rate and rhythm; S1, S2   ABDOMEN: Bowel sounds present; Soft, tenderness worst in suprapubic region, ND  EXTREMITIES:  1+ LE edema b/l, no calf tenderness   NERVOUS SYSTEM:  Alert & Oriented X3, speech clear. No deficits   : Suprapubic tenderness, No CVA tenderness      LABS:                        13.2   4.14  )-----------( 236      ( 18 Sep 2023 07:00 )             39.3     CBC Full  -  ( 18 Sep 2023 07:00 )  WBC Count : 4.14 K/uL  Hemoglobin : 13.2 g/dL  Hematocrit : 39.3 %  Platelet Count - Automated : 236 K/uL  Mean Cell Volume : 93.1 fl  Mean Cell Hemoglobin : 31.3 pg  Mean Cell Hemoglobin Concentration : 33.6 gm/dL  Auto Neutrophil # : 2.67 K/uL  Auto Lymphocyte # : 0.94 K/uL  Auto Monocyte # : 0.34 K/uL  Auto Eosinophil # : 0.12 K/uL  Auto Basophil # : 0.06 K/uL  Auto Neutrophil % : 64.6 %  Auto Lymphocyte % : 22.7 %  Auto Monocyte % : 8.2 %  Auto Eosinophil % : 2.9 %  Auto Basophil % : 1.4 %    18 Sep 2023 07:00    140    |  109    |  11     ----------------------------<  86     3.8     |  24     |  0.59     Ca    9.1        18 Sep 2023 07:00    TPro  7.1    /  Alb  3.4    /  TBili  0.8    /  DBili  x      /  AST  21     /  ALT  33     /  AlkPhos  59     18 Sep 2023 07:00    PT/INR - ( 17 Sep 2023 11:30 )   PT: 10.7 sec;   INR: 0.91 ratio         PTT - ( 17 Sep 2023 11:30 )  PTT:31.4 sec  Urinalysis Basic - ( 18 Sep 2023 07:00 )    Color: x / Appearance: x / SG: x / pH: x  Gluc: 86 mg/dL / Ketone: x  / Bili: x / Urobili: x   Blood: x / Protein: x / Nitrite: x   Leuk Esterase: x / RBC: x / WBC x   Sq Epi: x / Non Sq Epi: x / Bacteria: x      CAPILLARY BLOOD GLUCOSE        RADIOLOGY & ADDITIONAL TESTS:   < from: Xray Chest 1 View AP/PA (09.17.23 @ 12:29) >  INTERPRETATION:  Heart size and the mediastinum are not accurately evaluated on this image.  The trachea is midline.  The lungs are clear.  No pleural effusion or pneumothorax is seen.  There is scoliosis of the spine.  IMPRESSION:  Clear lungs.  < end of copied text >      Personally reviewed.     Consultant(s) Notes Reviewed:  [x] YES  [ ] NO

## 2023-09-18 NOTE — CARE COORDINATION ASSESSMENT. - NSPASTMEDSURGHISTORY_GEN_ALL_CORE_FT
PAST MEDICAL & SURGICAL HISTORY:  HTN (hypertension)      S/P cataract surgery, left      S/P knee replacement  left      S/P appy      
IV discontinued, cath removed intact

## 2023-09-18 NOTE — PROGRESS NOTE ADULT - SUBJECTIVE AND OBJECTIVE BOX
[facility]   INFECTIOUS DISEASES   [address] [place] [address]  Tel: [phone]     Fax: [phone]  =========================================================  MD Arnoldo Palmer Kaushal, MD Cho, Michelle, MD Sunjit, Jaspal, MD  =========================================================    TANIA VICTOR [id number]    Follow up: Ecoli bacteremia    No fever, but still has frequency, stating not getting better, no flank or abdominal pain.   No nausea, vomiting or diarrhea, on regular diet.     Allergies:  tape (Rash)  No Known Drug Allergies    Medication:  acetaminophen     Tablet .. 650 milliGRAM(s) Oral every 6 hours PRN  acetaminophen     Tablet .. 650 milliGRAM(s) Oral every 6 hours PRN  enoxaparin Injectable 40 milliGRAM(s) SubCutaneous every 24 hours  ertapenem  IVPB 1000 milliGRAM(s) IV Intermittent every 24 hours  famotidine    Tablet 20 milliGRAM(s) Oral at bedtime  hydrochlorothiazide 25 milliGRAM(s) Oral daily  influenza  Vaccine (HIGH DOSE) 0.7 milliLiter(s) IntraMuscular once  losartan 50 milliGRAM(s) Oral daily  melatonin 3 milliGRAM(s) Oral at bedtime  polyethylene glycol 3350 17 Gram(s) Oral daily  saccharomyces boulardii 250 milliGRAM(s) Oral two times a day  sodium chloride 0.9%. 1000 milliLiter(s) IV Continuous <Continuous>     REVIEW OF SYSTEMS:  CONSTITUTIONAL:  No Fever or chills  HEENT:   No diplopia or blurred vision.  No earache, sore throat or runny nose.  CARDIOVASCULAR:  No chest pain or SOB  RESPIRATORY:  No cough, shortness of breath, PND or orthopnea.  GASTROINTESTINAL:  No nausea, vomiting or diarrhea.  GENITOURINARY:  No dysuria, frequency or urgency. No Blood in urine  MUSCULOSKELETAL:  no joint aches, no muscle pain  SKIN:  No change in skin, hair or nails.  NEUROLOGIC:  No paresthesias or weakness.  PSYCHIATRIC:  No disorder of thought or mood.  ENDOCRINE:  No heat or cold intolerance, polyuria or polydipsia.  HEMATOLOGICAL:  No easy bruising or bleeding.      Physical Exam:  ICU Vital Signs Last 24 Hrs  T(C): 36.3 (18 Sep 2023 04:56), Max: 36.9 (17 Sep 2023 16:16)  T(F): 97.4 (18 Sep 2023 04:56), Max: 98.5 (17 Sep 2023 16:16)  HR: 80 (18 Sep 2023 12:20) (63 - 82)  BP: 147/87 (18 Sep 2023 12:20) (127/73 - 175/77)  RR: 18 (18 Sep 2023 04:56) (18 - 18)  SpO2: 95% (18 Sep 2023 04:56) (93% - 96%)  O2 Parameters below as of 18 Sep 2023 04:56  Patient On (Oxygen Delivery Method): room air  GEN: NAD  HEENT: normocephalic and atraumatic. EOMI. BILLY.    NECK: Supple.  No lymphadenopathy   LUNGS: Clear to auscultation.  HEART: Regular rate and rhythm without murmur.  ABDOMEN: Soft, nontender, and nondistended.  Positive bowel sounds.    : No CVA tenderness  EXTREMITIES: Without edema.  MSK: no joint swelling  NEUROLOGIC: grossly intact.  PSYCHIATRIC: Appropriate affect .  SKIN: No rash     Labs:  09-18    140  |  109<H>  |  11  ----------------------------<  86  3.8   |  24  |  0.59    Ca    9.1      18 Sep 2023 07:00    TPro  7.1  /  Alb  3.4  /  TBili  0.8  /  DBili  x   /  AST  21  /  ALT  33  /  AlkPhos  59  09-18                        13.2   4.14  )-----------( 236      ( 18 Sep 2023 07:00 )             39.3     PT/INR - ( 17 Sep 2023 11:30 )   PT: 10.7 sec;   INR: 0.91 ratio    PTT - ( 17 Sep 2023 11:30 )  PTT:31.4 sec  Urinalysis Basic - ( 18 Sep 2023 07:00 )    Color: x / Appearance: x / SG: x / pH: x  Gluc: 86 mg/dL / Ketone: x  / Bili: x / Urobili: x   Blood: x / Protein: x / Nitrite: x   Leuk Esterase: x / RBC: x / WBC x   Sq Epi: x / Non Sq Epi: x / Bacteria: x    LIVER FUNCTIONS - ( 18 Sep 2023 07:00 )  Alb: 3.4 g/dL / Pro: 7.1 g/dL / ALK PHOS: 59 U/L / ALT: 33 U/L / AST: 21 U/L / GGT: x           All imaging and data are reviewed.   < from: Xray Chest 1 View AP/PA (09.17.23 @ 12:29) >  ACC: [id number] EXAM:  XR CHEST AP OR PA 1V   ORDERED BY: CARMEN AGEE   PROCEDURE DATE:  09/17/2023    INTERPRETATION:  TIME OF EXAM: September 17, 2023 at 12:18 PM.  CLINICAL INFORMATION: Sepsis.  COMPARISON:  None available  TECHNIQUE:   AP Portable chest x-ray. Metallic artifacts from a bra   project over the image.  INTERPRETATION:  Heart size and the mediastinum are not accurately evaluated on this image.  The trachea is midline.  The lungs are clear.  No pleural effusion or pneumothorax is seen.  There is scoliosis of the spine.  IMPRESSION:  Clear lungs.

## 2023-09-18 NOTE — CARE COORDINATION ASSESSMENT. - NSCAREPROVIDERS_GEN_ALL_CORE_FT
CARE PROVIDERS:  Accepting Physician: Kelvin Newton  Admitting: Kelvin Newton  Attending: Roel Garcia  Case Management: Peng Barnes  Case Management: Behringer, Megan  Case Management: Margarito Davis  Consultant: Silvia Victor ED ACP: Ivelisse Padilla ED Attending: Shan Isaacs ED Nurse: Branden Rodriguez  Nurse: Oralia Jon  Nurse: Kavita Paz  Ordered: ServiceAccount, [facility]  Override: Amelia Barrett  PCA/Nursing Assistant: Nitza Howard  Physical Therapy: Kenna Howard  Primary Team: Roel Garcia  Primary Team: Janneth Doty  Primary Team: Saji Dozier  Primary Team: Milka Torres  Primary Team: Kyung Mendoza  Primary Team: Bo Boles  Primary Team: Madison Arceo  Registered Dietitian: Maryjane Balderas  Student: Asha Sanchez// Supp. Assoc.: Asha Baldwin

## 2023-09-18 NOTE — PROGRESS NOTE ADULT - ASSESSMENT
67y  Female with history of HTN, insomnia, GERD. Patient presents with continued dysuria, frequency, and urgency initially started in August. Patient reports a new sexual partner in June 2023 whom which she has vaginal intercourse. She developed a UTI in June and was treated by an urgent care center with Macrobid and then switched to Cipro. Developed another UTI in August, prescribed Cipro and then changed to Keflex also treated by an urgent care. Patient saw her GYN also and was tested fro STI's which she reports are negative. Her urinary symptoms initially improved and recently restarted after sexual intercourse in August. She reports she voids after intercourse, always wipes down, and was not told by her GYN that she has vaginal dryness. She was referred to a urologist and saw them in August who referred her to an ID doctor, one in [facility] who has not returned her messages, another in [place] who has no availability and finally was able to book an appointment in [place] for october. Due to her worsening symptoms and fever, urine cultures with multiple resistance she decided to come to the ER. Here she is afebrile, no leukocytosis. Started on Ertapenem.    ESBL Klebsiella pneumoniae UTI  Dysuria  Frequency     - Blood cultures pending  - Urine culture 9/5 with ESBL Klebsiella pneumoniae  - Urine culture from ED pending  - UA with pyuria   - Continue Ertapenem   - Follow up cultures  - Trend Fever  - Trend WBC    Will follow.     Silvia Victor MD  Division of Infectious Diseases   Please call ID service at [phone] with any question.      50 minutes spent on total encounter assessing patient, examination, chart review, counseling and coordinating care by the attending physician/nurse/care manager.   67y  Female with history of HTN, insomnia, GERD. Patient presents with continued dysuria, frequency, and urgency initially started in August. Patient reports a new sexual partner in June 2023 whom which she has vaginal intercourse. She developed a UTI in June and was treated by an urgent care center with Macrobid and then switched to Cipro. Developed another UTI in August, prescribed Cipro and then changed to Keflex also treated by an urgent care. Patient saw her GYN also and was tested fro STI's which she reports are negative. Her urinary symptoms initially improved and recently restarted after sexual intercourse in August. She reports she voids after intercourse, always wipes down, and was not told by her GYN that she has vaginal dryness. She was referred to a urologist and saw them in August who referred her to an ID doctor, one in [facility] who has not returned her messages, another in [place] who has no availability and finally was able to book an appointment in [place] for october. Due to her worsening symptoms and fever, urine cultures with multiple resistance she decided to come to the ER. Here she is afebrile, no leukocytosis. Started on Ertapenem.    ESBL Klebsiella pneumoniae UTI  Dysuria  Frequency     - Blood cultures pending  - Urine culture 9/5 with ESBL Klebsiella pneumoniae  - Urine culture from ED pending  - UA with pyuria   - Continue Ertapenem, most likely will need a midline to treat one week total   - Follow up cultures  - Trend Fever and WBC    Will follow.     Silvia Victor MD  Division of Infectious Diseases   Please call ID service at [phone] with any question.      50 minutes spent on total encounter assessing patient, examination, chart review, counseling and coordinating care by the attending physician/nurse/care manager.

## 2023-09-18 NOTE — DISCHARGE NOTE PROVIDER - CARE PROVIDER_API CALL
Kamryn Dumas  [facility]  [address] [place] [address], [address]  [place] [address]  Phone: [phone]  Fax: [phone]  Follow Up Time:

## 2023-09-18 NOTE — PATIENT CHOICE NOTE. - NSPTCHOICESTATE_GEN_ALL_CORE

## 2023-09-18 NOTE — CARE COORDINATION ASSESSMENT. - OTHER PERTINENT DISCHARGE PLANNING INFORMATION:
Met patient at bedside.  Explained role of CM, verbalized understanding. Pt was made aware a CM will remain available through hospitalization.  Contact information given in discharge/ transitions resource folder. Pt lives with daughter and independent w ADL. Pt drives to appointments, has 4 stairs to enter home, 12 stairs inside, denies community services, has cane and walker from TKR in 2016

## 2023-09-18 NOTE — PROGRESS NOTE ADULT - PROBLEM SELECTOR PLAN 1
- 9/8/23 UCx >100,000 CFU/ml Klebsiella pneumoniae ESBL; ertapenem sensitive (S <0.5)  - S/P 1 L NS, Ertapenem 1 g   - Labs on admission significant for Lactate 0.9, WBC 4.6,  BUN/Cr 16/0.59, K 3.7  - U/A: Moderate Leuk esterase, negative nitrite, WBC 25, Many bacteria  - Per ID, UA with pyuria, c/w ertapenem 1gm daily, f/u cultures, trend fever and WBC  - f/u BCx x2, UCx  - ID following, recs appreciated  - Await abx duration per ID recommendation - 9/8/23 UCx >100,000 CFU/ml Klebsiella pneumoniae ESBL; ertapenem sensitive (S <0.5)  - S/P 1 L NS, Ertapenem 1 g   - Labs on admission significant for Lactate 0.9, WBC 4.6,  BUN/Cr 16/0.59, K 3.7  - U/A: Moderate Leuk esterase, negative nitrite, WBC 25, Many bacteria  - Per ID, UA with pyuria, c/w ertapenem 1gm daily, f/u cultures, trend fever and WBC  - f/u BCx x2, UCx  - ID following, recs appreciated  -Per ID, pt. may need midline x1 week for IV abx.  - Await abx duration per ID recommendation - 9/8/23 UCx >100,000 CFU/ml Klebsiella pneumoniae ESBL; ertapenem sensitive   - Labs on admission significant for Lactate 0.9, WBC 4.6,  BUN/Cr 16/0.59  - U/A: Moderate Leuk esterase, negative nitrite, WBC 25, Many bacteria  - Per ID, UA with pyuria, c/w ertapenem 1gm daily, f/u cultures, trend fever and WBC  - f/u BCx x2, UCx  - ID (Dr. Victor) following, recs appreciated  - Per ID, pt will likely need midline x1 week for IV abx.  - IR would not place midline yet, prior to negative BCx - will place on Wednesday  - pt with concern she may be developing a yeast infection -- discussed with ID, given fluconazole 150mg po x1  - saccharomyces for probiotic

## 2023-09-19 LAB
ANION GAP SERPL CALC-SCNC: 7 MMOL/L — SIGNIFICANT CHANGE UP (ref 5–17)
BUN SERPL-MCNC: 13 MG/DL — SIGNIFICANT CHANGE UP (ref 7–23)
CALCIUM SERPL-MCNC: 9.3 MG/DL — SIGNIFICANT CHANGE UP (ref 8.5–10.1)
CHLORIDE SERPL-SCNC: 107 MMOL/L — SIGNIFICANT CHANGE UP (ref 96–108)
CO2 SERPL-SCNC: 25 MMOL/L — SIGNIFICANT CHANGE UP (ref 22–31)
CREAT SERPL-MCNC: 0.59 MG/DL — SIGNIFICANT CHANGE UP (ref 0.5–1.3)
EGFR: 99 ML/MIN/1.73M2 — SIGNIFICANT CHANGE UP
GLUCOSE SERPL-MCNC: 93 MG/DL — SIGNIFICANT CHANGE UP (ref 70–99)
HCT VFR BLD CALC: 42.2 % — SIGNIFICANT CHANGE UP (ref 34.5–45)
HGB BLD-MCNC: 14 G/DL — SIGNIFICANT CHANGE UP (ref 11.5–15.5)
MCHC RBC-ENTMCNC: 31.3 PG — SIGNIFICANT CHANGE UP (ref 27–34)
MCHC RBC-ENTMCNC: 33.2 GM/DL — SIGNIFICANT CHANGE UP (ref 32–36)
MCV RBC AUTO: 94.4 FL — SIGNIFICANT CHANGE UP (ref 80–100)
NRBC # BLD: 0 /100 WBCS — SIGNIFICANT CHANGE UP (ref 0–0)
PLATELET # BLD AUTO: 281 K/UL — SIGNIFICANT CHANGE UP (ref 150–400)
POTASSIUM SERPL-MCNC: 3.8 MMOL/L — SIGNIFICANT CHANGE UP (ref 3.5–5.3)
POTASSIUM SERPL-SCNC: 3.8 MMOL/L — SIGNIFICANT CHANGE UP (ref 3.5–5.3)
RBC # BLD: 4.47 M/UL — SIGNIFICANT CHANGE UP (ref 3.8–5.2)
RBC # FLD: 13.2 % — SIGNIFICANT CHANGE UP (ref 10.3–14.5)
SODIUM SERPL-SCNC: 139 MMOL/L — SIGNIFICANT CHANGE UP (ref 135–145)
WBC # BLD: 4.19 K/UL — SIGNIFICANT CHANGE UP (ref 3.8–10.5)
WBC # FLD AUTO: 4.19 K/UL — SIGNIFICANT CHANGE UP (ref 3.8–10.5)

## 2023-09-19 PROCEDURE — 99232 SBSQ HOSP IP/OBS MODERATE 35: CPT

## 2023-09-19 PROCEDURE — 99233 SBSQ HOSP IP/OBS HIGH 50: CPT | Mod: GC

## 2023-09-19 RX ADMIN — Medication 10 MILLIGRAM(S): at 21:03

## 2023-09-19 RX ADMIN — LOSARTAN POTASSIUM 50 MILLIGRAM(S): 100 TABLET, FILM COATED ORAL at 06:29

## 2023-09-19 RX ADMIN — FAMOTIDINE 20 MILLIGRAM(S): 10 INJECTION INTRAVENOUS at 21:03

## 2023-09-19 RX ADMIN — Medication 250 MILLIGRAM(S): at 19:01

## 2023-09-19 RX ADMIN — Medication 250 MILLIGRAM(S): at 06:29

## 2023-09-19 RX ADMIN — ERTAPENEM SODIUM 120 MILLIGRAM(S): 1 INJECTION, POWDER, LYOPHILIZED, FOR SOLUTION INTRAMUSCULAR; INTRAVENOUS at 13:28

## 2023-09-19 RX ADMIN — ENOXAPARIN SODIUM 40 MILLIGRAM(S): 100 INJECTION SUBCUTANEOUS at 19:00

## 2023-09-19 NOTE — PROGRESS NOTE ADULT - PROBLEM SELECTOR PLAN 2
Chronic  - BP elevated on admission, 137/81 -> 158/77  - c/w losartan  - c/w home dose HCTZ 25mg po QD Chronic  - BP elevated on admission, now 133/85.  - c/w losartan  - c/w home dose HCTZ 25mg po QD

## 2023-09-19 NOTE — PROGRESS NOTE ADULT - TIME BILLING
Pt seen + examined. Case discussed with resident. Agree with assessment and plan above (edited by me in detail):  Time spent: 50min. More than 50% of the visit was spent counseling the patient on medical condition -- ESBL Klebsiella UTI and only option of IV abx, ertapenem, midline, home IV Abx and what that entails, fluconazole empirically for yeast infection.    66yo F with PMH of hypertension and insomnia, presents with dysuria x 1 month, with recent urine culture growing multidrug resistant ESBL Klebsiella, admitted for IV antibiotics for ESBL Klebsiella UTI.    - 9/8/23 UCx >100,000 CFU/ml Klebsiella pneumoniae ESBL; ertapenem sensitive   - Labs on admission significant for Lactate 0.9, WBC 4.6,  BUN/Cr 16/0.59  - U/A: Moderate Leuk esterase, negative nitrite, WBC 25, Many bacteria  - Per ID, UA with pyuria, c/w ertapenem 1gm daily for 1 week total, f/u cultures, trend fever and WBC  - f/u BCx x2, UCx  - Blood cultures show NGTD  - ID (Dr. Victor) following, recs appreciated  - Per ID, pt will likely need midline to complete 1 week of IV abx.  - IR could not place midline prior to negative BCx - will place on Wednesday  - pt with concern she may be developing a yeast infection -- discussed with ID, given fluconazole 150mg po x1  - pt endorses improvement in symptoms on current abx  - saccharomyces for probiotic  - CM working on home care / home IV Abx    Disp: likely d/c tomorrow after placement of midline and dose of Invanz, as long as home care for IV abx is able to start on Thursday.
Pt seen + examined. Case discussed with resident. Agree with assessment and plan above (edited by me in detail):  Time spent: 50min. More than 50% of the visit was spent counseling the patient and pt's family on medical condition -- ESBL Klebsiella UTI and only option of IV abx, ertapenem, midline, home IV Abx and what that entails.    66yo F with PMH of hypertension and insomnia, presents with dysuria x 1 month, with recent urine culture growing multidrug resistant ESBL Klebsiella, admitted for IV antibiotics for ESBL Klebsiella UTI.    - 9/8/23 UCx >100,000 CFU/ml Klebsiella pneumoniae ESBL; ertapenem sensitive   - Labs on admission significant for Lactate 0.9, WBC 4.6,  BUN/Cr 16/0.59  - U/A: Moderate Leuk esterase, negative nitrite, WBC 25, Many bacteria  - Per ID, UA with pyuria, c/w ertapenem 1gm daily, f/u cultures, trend fever and WBC  - f/u BCx x2, UCx  - ID (Dr. Victor) following, recs appreciated  - Per ID, pt will likely need midline x1 week for IV abx.  - IR would not place midline yet, prior to negative BCx - will place on Wednesday  - pt with concern she may be developing a yeast infection -- discussed with ID, given fluconazole 150mg po x1  - saccharomyces for probiotic    - restarted home dose HCTZ  - c/w losartan and monitor BP

## 2023-09-19 NOTE — CASE MANAGEMENT PROGRESS NOTE - NSCMPROGRESSNOTE_GEN_ALL_CORE
met with patient to discuss home infusion.  Told pt $5.00 co pay would be due for Ertapenem. Pt is to be transitioned to home with [facility] with RN and infusion care. Referral sent to [facility]. Pt aware of plan and in agreement. CM discussed plan with MD and RN.

## 2023-09-19 NOTE — PROGRESS NOTE ADULT - SUBJECTIVE AND OBJECTIVE BOX
[facility]   INFECTIOUS DISEASES   [address] [place] [address]  Tel: [phone]     Fax: [phone]  =========================================================  MD Arnoldo Palmer Kaushal, MD Cho, Michelle, MD Sunjit, Jaspal, MD  =========================================================    TANIA VICTOR [id number]    Follow up: Ecoli bacteremia    No fever, frequency is better, feels more comfortable, no flank or abdominal pain.   No nausea, vomiting or diarrhea, on regular diet.     Allergies:  tape (Rash)  No Known Drug Allergies    Medication:  acetaminophen     Tablet .. 650 milliGRAM(s) Oral every 6 hours PRN  acetaminophen     Tablet .. 650 milliGRAM(s) Oral every 6 hours PRN  enoxaparin Injectable 40 milliGRAM(s) SubCutaneous every 24 hours  ertapenem  IVPB 1000 milliGRAM(s) IV Intermittent every 24 hours  famotidine    Tablet 20 milliGRAM(s) Oral at bedtime  hydrochlorothiazide 25 milliGRAM(s) Oral daily  influenza  Vaccine (HIGH DOSE) 0.7 milliLiter(s) IntraMuscular once  losartan 50 milliGRAM(s) Oral daily  melatonin 3 milliGRAM(s) Oral at bedtime  polyethylene glycol 3350 17 Gram(s) Oral daily  saccharomyces boulardii 250 milliGRAM(s) Oral two times a day  sodium chloride 0.9%. 1000 milliLiter(s) IV Continuous <Continuous>     REVIEW OF SYSTEMS:  CONSTITUTIONAL:  No Fever or chills  HEENT:   No diplopia or blurred vision.  No earache, sore throat or runny nose.  CARDIOVASCULAR:  No chest pain or SOB  RESPIRATORY:  No cough, shortness of breath, PND or orthopnea.  GASTROINTESTINAL:  No nausea, vomiting or diarrhea.  GENITOURINARY:  No dysuria, frequency or urgency. No Blood in urine  MUSCULOSKELETAL:  no joint aches, no muscle pain  SKIN:  No change in skin, hair or nails.  NEUROLOGIC:  No paresthesias or weakness.  PSYCHIATRIC:  No disorder of thought or mood.  ENDOCRINE:  No heat or cold intolerance, polyuria or polydipsia.  HEMATOLOGICAL:  No easy bruising or bleeding.      Physical Exam:  Vital Signs Last 24 Hrs  T(C): 36.6 (19 Sep 2023 12:56), Max: 36.6 (18 Sep 2023 20:59)  T(F): 97.9 (19 Sep 2023 12:56), Max: 97.9 (18 Sep 2023 20:59)  HR: 78 (19 Sep 2023 12:56) (69 - 78)  BP: 149/57 (19 Sep 2023 12:56) (126/74 - 149/57)  BP(mean): --  RR: 18 (19 Sep 2023 12:56) (18 - 18)  SpO2: 97% (19 Sep 2023 12:56) (94% - 97%)  Parameters below as of 19 Sep 2023 12:56  Patient On (Oxygen Delivery Method): room air  GEN: NAD  HEENT: normocephalic and atraumatic. EOMI. BILLY.    NECK: Supple.  No lymphadenopathy   LUNGS: Clear to auscultation.  HEART: Regular rate and rhythm without murmur.  ABDOMEN: Soft, nontender, and nondistended.  Positive bowel sounds.    : No CVA tenderness  EXTREMITIES: Without edema.  MSK: no joint swelling  NEUROLOGIC: grossly intact.  PSYCHIATRIC: Appropriate affect .  SKIN: No rash     Labs:                        14.0   4.19  )-----------( 281      ( 19 Sep 2023 05:40 )             42.2     09-19    139  |  107  |  13  ----------------------------<  93  3.8   |  25  |  0.59    Ca    9.3      19 Sep 2023 05:40    TPro  7.1  /  Alb  3.4  /  TBili  0.8  /  DBili  x   /  AST  21  /  ALT  33  /  AlkPhos  59  09-18    Culture - Blood (collected 09-17-23 @ 11:45)  Source: .Blood Blood-Peripheral    Culture - Blood (collected 09-17-23 @ 11:30)  Source: .Blood Blood-Peripheral    WBC Count: 4.19 K/uL (09-19-23 @ 05:40)  WBC Count: 4.14 K/uL (09-18-23 @ 07:00)  WBC Count: 4.60 K/uL (09-17-23 @ 11:30)    Creatinine: 0.59 mg/dL (09-19-23 @ 05:40)  Creatinine: 0.59 mg/dL (09-18-23 @ 07:00)  Creatinine: 0.59 mg/dL (09-17-23 @ 11:30)    COVID-19 PCR: NotDetec (09-17-23 @ 11:30)    All imaging and data are reviewed.   < from: Xray Chest 1 View AP/PA (09.17.23 @ 12:29) >  ACC: [id number] EXAM:  XR CHEST AP OR PA 1V   ORDERED BY: CARMEN AGEE   PROCEDURE DATE:  09/17/2023    INTERPRETATION:  TIME OF EXAM: September 17, 2023 at 12:18 PM.  CLINICAL INFORMATION: Sepsis.  COMPARISON:  None available  TECHNIQUE:   AP Portable chest x-ray. Metallic artifacts from a bra   project over the image.  INTERPRETATION:  Heart size and the mediastinum are not accurately evaluated on this image.  The trachea is midline.  The lungs are clear.  No pleural effusion or pneumothorax is seen.  There is scoliosis of the spine.  IMPRESSION:  Clear lungs.

## 2023-09-19 NOTE — CHART NOTE - NSCHARTNOTEFT_GEN_A_CORE
Nutrition follow up ( chart reviewed, events noted) Brief:     Factors impacting intake: [ ] none [ ] nausea  [ ] vomiting [ ] diarrhea [ ] constipation  [ ]chewing problems [ ] swallowing issues  [ ] other:     Diet Prescription: Diet, DASH/TLC:   Sodium & Cholesterol Restricted (09-17-23 @ 13:33)    Intake:     Current Weight: Weight (kg): 88 (09-17 @ 10:43)  % Weight Change    Pertinent Medications: MEDICATIONS  (STANDING):  enoxaparin Injectable 40 milliGRAM(s) SubCutaneous every 24 hours  ertapenem  IVPB 1000 milliGRAM(s) IV Intermittent every 24 hours  famotidine    Tablet 20 milliGRAM(s) Oral at bedtime  hydrochlorothiazide 25 milliGRAM(s) Oral daily  influenza  Vaccine (HIGH DOSE) 0.7 milliLiter(s) IntraMuscular once  losartan 50 milliGRAM(s) Oral daily  melatonin 10 milliGRAM(s) Oral at bedtime  polyethylene glycol 3350 17 Gram(s) Oral daily  saccharomyces boulardii 250 milliGRAM(s) Oral two times a day  sodium chloride 0.9%. 1000 milliLiter(s) (60 mL/Hr) IV Continuous <Continuous>    MEDICATIONS  (PRN):  acetaminophen     Tablet .. 650 milliGRAM(s) Oral every 6 hours PRN Temp greater or equal to 38C (100.4F)  acetaminophen     Tablet .. 650 milliGRAM(s) Oral every 6 hours PRN Mild Pain (1 - 3)    Pertinent Labs: 09-19 Na139 mmol/L Glu 93 mg/dL K+ 3.8 mmol/L Cr  0.59 mg/dL BUN 13 mg/dL 09-18 Alb 3.4 g/dL     CAPILLARY BLOOD GLUCOSE        Skin:     Estimated Needs:   [ ] no change since previous assessment  [ ] recalculated:     Previous Nutrition Diagnosis:   [ ] Inadequate Energy Intake [ ]Inadequate Oral Intake [ ] Excessive Energy Intake   [ ] Underweight [ ] Increased Nutrient Needs [ ] Overweight/Obesity   [ ] Altered GI Function [ ] Unintended Weight Loss [ ] Food & Nutrition Related Knowledge Deficit [ ] Malnutrition     Nutrition Diagnosis is [ ] ongoing  [ ] resolved [ ] not applicable     New Nutrition Diagnosis: [ ] not applicable       Interventions:   Recommend  [ ] Change Diet To:  [ ] Nutrition Supplement  [ ] Nutrition Support  [ ] Other:     Monitoring and Evaluation:   [ ] PO intake [ x ] Tolerance to diet prescription [ x ] weights [ x ] labs[ x ] follow up per protocol  [ ] other: Nutrition follow up ( chart reviewed, events noted) Brief: · Assessment	  66yo F with PMH of hypertension and insomnia, presents with dysuria x 1 month, with recent urine culture growing multidrug resistant ESBL Klebsiella admitted for IV antibiotics for ESBL Klebsiella UTI.        Problem/Plan - 1:  ·  Problem: UTI (urinary tract infection).   ·  Plan: - 9/8/23 UCx >100,000 CFU/ml Klebsiella pneumoniae ESBL; ertapenem sensitive   - Labs on admission significant for Lactate 0.9, WBC 4.6,  BUN/Cr 16/0.59  - U/A: Moderate Leuk esterase, negative nitrite, WBC 25, Many bacteria  - Per ID, UA with pyuria, c/w ertapenem 1gm daily, f/u cultures, trend fever and WBC  - f/u BCx x2, UCx  - ID (Dr. Victor) following, recs appreciated  - Per ID, pt will likely need midline x1 week for IV abx.  - IR would not place midline yet, prior to negative BCx - will place on Wednesday  - pt with concern she may be developing a yeast infection -- discussed with ID, given fluconazole 150mg po x1  - saccharomyces for probiotic.     Problem/Plan - 2:  ·  Problem: Hypertension.   ·  Plan: Chronic  - BP elevated on admission, 137/81 -> 158/77  - c/w losartan  - restarted home dose HCTZ, originally held in setting of suspected dehydration.     Problem/Plan - 3:  ·  Problem: Insomnia.   ·  Plan: Chronic  - c/w home dose melatonin 10mg po QHS.     Problem/Plan - 4:  ·  Problem: GERD (gastroesophageal reflux disease).   ·  Plan: Chronic  - c/w home famotidine QHS.     Problem/Plan - 5:  ·  Problem: Constipation.   ·  Plan: Chronic  - started bowel regimen with polyethylene glycol QD.     Problem/Plan - 6:  ·  Problem: Need for prophylactic measure.   ·  Plan: DVT ppx lovenox 40mg subQ qd.    Attestation Statements:       Factors impacting intake: [ ] none [ ] nausea  [ ] vomiting [ ] diarrhea [ ] constipation  [ ]chewing problems [ ] swallowing issues  [ ] other:     Diet Prescription: Diet, DASH/TLC:   Sodium & Cholesterol Restricted (09-17-23 @ 13:33)    Intake:     Current Weight: Weight (kg): 88 (09-17 @ 10:43)  % Weight Change    Pertinent Medications: MEDICATIONS  (STANDING):  enoxaparin Injectable 40 milliGRAM(s) SubCutaneous every 24 hours  ertapenem  IVPB 1000 milliGRAM(s) IV Intermittent every 24 hours  famotidine    Tablet 20 milliGRAM(s) Oral at bedtime  hydrochlorothiazide 25 milliGRAM(s) Oral daily  influenza  Vaccine (HIGH DOSE) 0.7 milliLiter(s) IntraMuscular once  losartan 50 milliGRAM(s) Oral daily  melatonin 10 milliGRAM(s) Oral at bedtime  polyethylene glycol 3350 17 Gram(s) Oral daily  saccharomyces boulardii 250 milliGRAM(s) Oral two times a day  sodium chloride 0.9%. 1000 milliLiter(s) (60 mL/Hr) IV Continuous <Continuous>    MEDICATIONS  (PRN):  acetaminophen     Tablet .. 650 milliGRAM(s) Oral every 6 hours PRN Temp greater or equal to 38C (100.4F)  acetaminophen     Tablet .. 650 milliGRAM(s) Oral every 6 hours PRN Mild Pain (1 - 3)    Pertinent Labs: 09-19 Na139 mmol/L Glu 93 mg/dL K+ 3.8 mmol/L Cr  0.59 mg/dL BUN 13 mg/dL 09-18 Alb 3.4 g/dL     CAPILLARY BLOOD GLUCOSE        Skin:     Estimated Needs:   [ ] no change since previous assessment  [ ] recalculated:     Previous Nutrition Diagnosis:   [ ] Inadequate Energy Intake [ ]Inadequate Oral Intake [ ] Excessive Energy Intake   [ ] Underweight [ ] Increased Nutrient Needs [ ] Overweight/Obesity   [ ] Altered GI Function [ ] Unintended Weight Loss [ ] Food & Nutrition Related Knowledge Deficit [ ] Malnutrition     Nutrition Diagnosis is [ ] ongoing  [ ] resolved [ ] not applicable     New Nutrition Diagnosis: [ ] not applicable       Interventions:   Recommend  [ ] Change Diet To:  [ ] Nutrition Supplement  [ ] Nutrition Support  [ ] Other:     Monitoring and Evaluation:   [ ] PO intake [ x ] Tolerance to diet prescription [ x ] weights [ x ] labs[ x ] follow up per protocol  [ ] other:

## 2023-09-19 NOTE — PROGRESS NOTE ADULT - SUBJECTIVE AND OBJECTIVE BOX
Patient is a 67y old  Female who presents with a chief complaint of ESBL Klebisella UTI (18 Sep 2023 12:46)      Subjective:  INTERVAL HPI/OVERNIGHT EVENTS: Patient seen and examined at bedside. No acute overnight events occurred. Patient stated she is feeling better and that she was able to sleep well as she only had to go to the bathroom 2x last night. Patient c/o mild suprapubic tenderness 3/10 at this time. Patient states she was able to have 2 normal BM yesterday s/p Miralax. Denies dysuria, urgency, frequency, flank pain, fevers, chills, headache, lightheadedness, chest pain, dyspnea, abdominal pain, n/v/d/c.    MEDICATIONS  (STANDING):  enoxaparin Injectable 40 milliGRAM(s) SubCutaneous every 24 hours  ertapenem  IVPB 1000 milliGRAM(s) IV Intermittent every 24 hours  famotidine    Tablet 20 milliGRAM(s) Oral at bedtime  hydrochlorothiazide 25 milliGRAM(s) Oral daily  influenza  Vaccine (HIGH DOSE) 0.7 milliLiter(s) IntraMuscular once  losartan 50 milliGRAM(s) Oral daily  melatonin 10 milliGRAM(s) Oral at bedtime  polyethylene glycol 3350 17 Gram(s) Oral daily  saccharomyces boulardii 250 milliGRAM(s) Oral two times a day    MEDICATIONS  (PRN):  acetaminophen     Tablet .. 650 milliGRAM(s) Oral every 6 hours PRN Mild Pain (1 - 3)  acetaminophen     Tablet .. 650 milliGRAM(s) Oral every 6 hours PRN Temp greater or equal to 38C (100.4F)      Allergies    tape (Rash)  No Known Drug Allergies    Intolerances        REVIEW OF SYSTEMS:  CONSTITUTIONAL: No fever or chills  HEENT: No headache, or sore throat  RESPIRATORY: No cough, or shortness of breath  CARDIOVASCULAR: No chest pain, or palpitations  GASTROINTESTINAL: No abd pain, nausea, vomiting,  diarrhea, or constipation  GENITOURINARY: + mild suprapubic tenderness, No dysuria, urgency, frequency, hematuria, or flank pain        Objective:  Vital Signs Last 24 Hrs  T(C): 36.5 (19 Sep 2023 05:04), Max: 36.6 (18 Sep 2023 20:59)  T(F): 97.7 (19 Sep 2023 05:04), Max: 97.9 (18 Sep 2023 20:59)  HR: 72 (19 Sep 2023 05:04) (69 - 80)  BP: 133/85 (19 Sep 2023 05:04) (126/74 - 147/87)  BP(mean): --  RR: 18 (18 Sep 2023 20:59) (18 - 18)  SpO2: 97% (19 Sep 2023 05:04) (94% - 97%)    Parameters below as of 19 Sep 2023 05:04  Patient On (Oxygen Delivery Method): room air      GENERAL: NAD at rest, lying comfortably in bed  HEENT: Moist mucous membranes, anicteric   CHEST/LUNG: Clear to auscultation bilaterally; No rales, rhonchi, wheezing. Unlabored respirations  HEART: Regular rate and rhythm; S1, S2   ABDOMEN: Bowel sounds present; Soft, mild tenderness in suprapubic region, ND  EXTREMITIES:  1+ LE edema b/l, no calf tenderness   NERVOUS SYSTEM:  Alert & Oriented X3, speech clear. No deficits   : Suprapubic tenderness, No CVA tenderness      LABS:                        14.0   4.19  )-----------( 281      ( 19 Sep 2023 05:40 )             42.2     CBC Full  -  ( 19 Sep 2023 05:40 )  WBC Count : 4.19 K/uL  Hemoglobin : 14.0 g/dL  Hematocrit : 42.2 %  Platelet Count - Automated : 281 K/uL  Mean Cell Volume : 94.4 fl  Mean Cell Hemoglobin : 31.3 pg  Mean Cell Hemoglobin Concentration : 33.2 gm/dL  Auto Neutrophil # : x  Auto Lymphocyte # : x  Auto Monocyte # : x  Auto Eosinophil # : x  Auto Basophil # : x  Auto Neutrophil % : x  Auto Lymphocyte % : x  Auto Monocyte % : x  Auto Eosinophil % : x  Auto Basophil % : x    19 Sep 2023 05:40    139    |  107    |  13     ----------------------------<  93     3.8     |  25     |  0.59     Ca    9.3        19 Sep 2023 05:40      PT/INR - ( 17 Sep 2023 11:30 )   PT: 10.7 sec;   INR: 0.91 ratio         PTT - ( 17 Sep 2023 11:30 )  PTT:31.4 sec  Urinalysis Basic - ( 19 Sep 2023 05:40 )    Color: x / Appearance: x / SG: x / pH: x  Gluc: 93 mg/dL / Ketone: x  / Bili: x / Urobili: x   Blood: x / Protein: x / Nitrite: x   Leuk Esterase: x / RBC: x / WBC x   Sq Epi: x / Non Sq Epi: x / Bacteria: x      CAPILLARY BLOOD GLUCOSE            Culture - Blood (collected 09-17-23 @ 11:45)  Source: .Blood Blood-Peripheral  Preliminary Report (09-18-23 @ 20:02):    No growth at 24 hours    Culture - Blood (collected 09-17-23 @ 11:30)  Source: .Blood Blood-Peripheral  Preliminary Report (09-18-23 @ 20:02):    No growth at 24 hours        Personally reviewed.     Consultant(s) Notes Reviewed:  [x] YES  [ ] NO     Patient is a 67y old  Female who presents with a chief complaint of ESBL Klebsiella UTI, dysuria.      Subjective:  INTERVAL HPI/OVERNIGHT EVENTS: Patient seen and examined at bedside. No acute overnight events occurred. Patient stated she is feeling better and that she was able to sleep well as she only had to go to the bathroom 2x last night. Patient c/o mild suprapubic tenderness 3/10 at this time. Patient states she was able to have 2 normal BMs yesterday s/p Miralax. Denies dysuria, urgency, frequency, flank pain, fevers, chills, headache, lightheadedness, chest pain, dyspnea, abdominal pain, n/v/d/c.    MEDICATIONS  (STANDING):  enoxaparin Injectable 40 milliGRAM(s) SubCutaneous every 24 hours  ertapenem  IVPB 1000 milliGRAM(s) IV Intermittent every 24 hours  famotidine    Tablet 20 milliGRAM(s) Oral at bedtime  hydrochlorothiazide 25 milliGRAM(s) Oral daily  influenza  Vaccine (HIGH DOSE) 0.7 milliLiter(s) IntraMuscular once  losartan 50 milliGRAM(s) Oral daily  melatonin 10 milliGRAM(s) Oral at bedtime  polyethylene glycol 3350 17 Gram(s) Oral daily  saccharomyces boulardii 250 milliGRAM(s) Oral two times a day    MEDICATIONS  (PRN):  acetaminophen     Tablet .. 650 milliGRAM(s) Oral every 6 hours PRN Mild Pain (1 - 3)  acetaminophen     Tablet .. 650 milliGRAM(s) Oral every 6 hours PRN Temp greater or equal to 38C (100.4F)      Allergies    tape (Rash)  No Known Drug Allergies    Intolerances        REVIEW OF SYSTEMS:  CONSTITUTIONAL: No fever or chills  HEENT: No headache, or sore throat  RESPIRATORY: No cough, or shortness of breath  CARDIOVASCULAR: No chest pain, or palpitations  GASTROINTESTINAL: No abd pain, nausea, vomiting,  diarrhea, or constipation  GENITOURINARY: Dysuria and urgency improving, denies hematuria or flank pain        Objective:  Vital Signs Last 24 Hrs  T(C): 36.5 (19 Sep 2023 05:04), Max: 36.6 (18 Sep 2023 20:59)  T(F): 97.7 (19 Sep 2023 05:04), Max: 97.9 (18 Sep 2023 20:59)  HR: 72 (19 Sep 2023 05:04) (69 - 80)  BP: 133/85 (19 Sep 2023 05:04) (126/74 - 147/87)  BP(mean): --  RR: 18 (18 Sep 2023 20:59) (18 - 18)  SpO2: 97% (19 Sep 2023 05:04) (94% - 97%)    Parameters below as of 19 Sep 2023 05:04  Patient On (Oxygen Delivery Method): room air      GENERAL: NAD at rest, lying comfortably in bed  HEENT: Moist mucous membranes, anicteric   CHEST/LUNG: Clear to auscultation bilaterally; No rales, rhonchi, wheezing. Unlabored respirations  HEART: Regular rate and rhythm; S1, S2   ABDOMEN: Bowel sounds present; soft, mild tenderness in suprapubic region, ND  EXTREMITIES:  trace LE edema b/l, no calf tenderness   NERVOUS SYSTEM:  Alert & Oriented X3, speech clear. No deficits   : +mild suprapubic tenderness, No CVA tenderness      LABS:                        14.0   4.19  )-----------( 281      ( 19 Sep 2023 05:40 )             42.2     CBC Full  -  ( 19 Sep 2023 05:40 )  WBC Count : 4.19 K/uL  Hemoglobin : 14.0 g/dL  Hematocrit : 42.2 %  Platelet Count - Automated : 281 K/uL  Mean Cell Volume : 94.4 fl  Mean Cell Hemoglobin : 31.3 pg  Mean Cell Hemoglobin Concentration : 33.2 gm/dL  Auto Neutrophil # : x  Auto Lymphocyte # : x  Auto Monocyte # : x  Auto Eosinophil # : x  Auto Basophil # : x  Auto Neutrophil % : x  Auto Lymphocyte % : x  Auto Monocyte % : x  Auto Eosinophil % : x  Auto Basophil % : x    19 Sep 2023 05:40    139    |  107    |  13     ----------------------------<  93     3.8     |  25     |  0.59     Ca    9.3        19 Sep 2023 05:40      PT/INR - ( 17 Sep 2023 11:30 )   PT: 10.7 sec;   INR: 0.91 ratio         PTT - ( 17 Sep 2023 11:30 )  PTT:31.4 sec  Urinalysis Basic - ( 19 Sep 2023 05:40 )    Color: x / Appearance: x / SG: x / pH: x  Gluc: 93 mg/dL / Ketone: x  / Bili: x / Urobili: x   Blood: x / Protein: x / Nitrite: x   Leuk Esterase: x / RBC: x / WBC x   Sq Epi: x / Non Sq Epi: x / Bacteria: x      CAPILLARY BLOOD GLUCOSE            Culture - Blood (collected 09-17-23 @ 11:45)  Source: .Blood Blood-Peripheral  Preliminary Report (09-18-23 @ 20:02):    No growth at 24 hours    Culture - Blood (collected 09-17-23 @ 11:30)  Source: .Blood Blood-Peripheral  Preliminary Report (09-18-23 @ 20:02):    No growth at 24 hours        Personally reviewed.     Consultant(s) Notes Reviewed:  [x] YES  [ ] NO

## 2023-09-19 NOTE — PROGRESS NOTE ADULT - ASSESSMENT
68yo F with PMH of hypertension and insomnia, presents with dysuria x 1 month, with recent urine culture growing multidrug resistant ESBL Klebsiella admitted for IV antibiotics for ESBL Klebsiella UTI.

## 2023-09-19 NOTE — PROGRESS NOTE ADULT - ASSESSMENT
67y  Female with history of HTN, insomnia, GERD. Patient presents with continued dysuria, frequency, and urgency initially started in August. Patient reports a new sexual partner in June 2023 whom which she has vaginal intercourse. She developed a UTI in June and was treated by an urgent care center with Macrobid and then switched to Cipro. Developed another UTI in August, prescribed Cipro and then changed to Keflex also treated by an urgent care. Patient saw her GYN also and was tested fro STI's which she reports are negative. Her urinary symptoms initially improved and recently restarted after sexual intercourse in August. She reports she voids after intercourse, always wipes down, and was not told by her GYN that she has vaginal dryness. She was referred to a urologist and saw them in August who referred her to an ID doctor, one in [facility] who has not returned her messages, another in [place] who has no availability and finally was able to book an appointment in [place] for october. Due to her worsening symptoms and fever, urine cultures with multiple resistance she decided to come to the ER. Here she is afebrile, no leukocytosis. Started on Ertapenem.    ESBL Klebsiella pneumoniae UTI  Dysuria  Frequency     - Blood cultures NGTD   - Urine culture 9/5 with ESBL Klebsiella pneumoniae  - Urine culture from ED pending  - UA with pyuria   - Continue Ertapenem, most likely will need a midline to treat one week total   - Follow up cultures  - Trend Fever and WBC  - One dose fluconazole 150mg given for possible candida VV.     Will follow.     Silvia Victor MD  Division of Infectious Diseases   Please call ID service at [phone] with any question.      50 minutes spent on total encounter assessing patient, examination, chart review, counseling and coordinating care by the attending physician/nurse/care manager.

## 2023-09-19 NOTE — PROGRESS NOTE ADULT - PROBLEM SELECTOR PLAN 1
- 9/8/23 UCx >100,000 CFU/ml Klebsiella pneumoniae ESBL; ertapenem sensitive   - Labs on admission significant for Lactate 0.9, WBC 4.6,  BUN/Cr 16/0.59  - U/A: Moderate Leuk esterase, negative nitrite, WBC 25, Many bacteria  - Per ID, UA with pyuria, c/w ertapenem 1gm daily for 1 week total, f/u cultures, trend fever and WBC  - f/u BCx x2, UCx  - Blood cultures show no growth at 24 hours  - ID (Dr. Victor) following, recs appreciated  - Per ID, pt will likely need midline x1 week for IV abx.  - IR would not place midline yet, prior to negative BCx - will place on Wednesday  - pt with concern she may be developing a yeast infection -- discussed with ID, given fluconazole 150mg po x1, pt endorses improvement in symptoms   - saccharomyces for probiotic - 9/8/23 UCx >100,000 CFU/ml Klebsiella pneumoniae ESBL; ertapenem sensitive   - Labs on admission significant for Lactate 0.9, WBC 4.6,  BUN/Cr 16/0.59  - U/A: Moderate Leuk esterase, negative nitrite, WBC 25, Many bacteria  - Per ID, UA with pyuria, c/w ertapenem 1gm daily for 1 week total, f/u cultures, trend fever and WBC  - f/u BCx x2, UCx  - Blood cultures show NGTD  - ID (Dr. Victor) following, recs appreciated  - Per ID, pt will likely need midline to complete 1 week of IV abx.  - IR could not place midline prior to negative BCx - will place on Wednesday  - pt with concern she may be developing a yeast infection -- discussed with ID, given fluconazole 150mg po x1  - pt endorses improvement in symptoms on current abx  - saccharomyces for probiotic  - CM working on home care / home IV Abx

## 2023-09-20 ENCOUNTER — TRANSCRIPTION ENCOUNTER (OUTPATIENT)
Age: 68
End: 2023-09-20

## 2023-09-20 VITALS
RESPIRATION RATE: 18 BRPM | OXYGEN SATURATION: 96 % | DIASTOLIC BLOOD PRESSURE: 83 MMHG | HEART RATE: 72 BPM | TEMPERATURE: 98 F | SYSTOLIC BLOOD PRESSURE: 144 MMHG

## 2023-09-20 LAB
ALBUMIN SERPL ELPH-MCNC: 3.7 G/DL — SIGNIFICANT CHANGE UP (ref 3.3–5)
ALP SERPL-CCNC: 65 U/L — SIGNIFICANT CHANGE UP (ref 40–120)
ALT FLD-CCNC: 54 U/L — SIGNIFICANT CHANGE UP (ref 12–78)
ANION GAP SERPL CALC-SCNC: 5 MMOL/L — SIGNIFICANT CHANGE UP (ref 5–17)
AST SERPL-CCNC: 29 U/L — SIGNIFICANT CHANGE UP (ref 15–37)
BASOPHILS # BLD AUTO: 0.07 K/UL — SIGNIFICANT CHANGE UP (ref 0–0.2)
BASOPHILS NFR BLD AUTO: 1.5 % — SIGNIFICANT CHANGE UP (ref 0–2)
BILIRUB SERPL-MCNC: 0.6 MG/DL — SIGNIFICANT CHANGE UP (ref 0.2–1.2)
BUN SERPL-MCNC: 13 MG/DL — SIGNIFICANT CHANGE UP (ref 7–23)
CALCIUM SERPL-MCNC: 9.4 MG/DL — SIGNIFICANT CHANGE UP (ref 8.5–10.1)
CHLORIDE SERPL-SCNC: 103 MMOL/L — SIGNIFICANT CHANGE UP (ref 96–108)
CO2 SERPL-SCNC: 29 MMOL/L — SIGNIFICANT CHANGE UP (ref 22–31)
CREAT SERPL-MCNC: 0.68 MG/DL — SIGNIFICANT CHANGE UP (ref 0.5–1.3)
EGFR: 95 ML/MIN/1.73M2 — SIGNIFICANT CHANGE UP
EOSINOPHIL # BLD AUTO: 0.11 K/UL — SIGNIFICANT CHANGE UP (ref 0–0.5)
EOSINOPHIL NFR BLD AUTO: 2.3 % — SIGNIFICANT CHANGE UP (ref 0–6)
GLUCOSE SERPL-MCNC: 98 MG/DL — SIGNIFICANT CHANGE UP (ref 70–99)
HCT VFR BLD CALC: 45.7 % — HIGH (ref 34.5–45)
HGB BLD-MCNC: 15.2 G/DL — SIGNIFICANT CHANGE UP (ref 11.5–15.5)
IMM GRANULOCYTES NFR BLD AUTO: 0.2 % — SIGNIFICANT CHANGE UP (ref 0–0.9)
LYMPHOCYTES # BLD AUTO: 1.05 K/UL — SIGNIFICANT CHANGE UP (ref 1–3.3)
LYMPHOCYTES # BLD AUTO: 22.2 % — SIGNIFICANT CHANGE UP (ref 13–44)
MCHC RBC-ENTMCNC: 31.3 PG — SIGNIFICANT CHANGE UP (ref 27–34)
MCHC RBC-ENTMCNC: 33.3 GM/DL — SIGNIFICANT CHANGE UP (ref 32–36)
MCV RBC AUTO: 94.2 FL — SIGNIFICANT CHANGE UP (ref 80–100)
MONOCYTES # BLD AUTO: 0.42 K/UL — SIGNIFICANT CHANGE UP (ref 0–0.9)
MONOCYTES NFR BLD AUTO: 8.9 % — SIGNIFICANT CHANGE UP (ref 2–14)
NEUTROPHILS # BLD AUTO: 3.06 K/UL — SIGNIFICANT CHANGE UP (ref 1.8–7.4)
NEUTROPHILS NFR BLD AUTO: 64.9 % — SIGNIFICANT CHANGE UP (ref 43–77)
NRBC # BLD: 0 /100 WBCS — SIGNIFICANT CHANGE UP (ref 0–0)
PLATELET # BLD AUTO: 314 K/UL — SIGNIFICANT CHANGE UP (ref 150–400)
POTASSIUM SERPL-MCNC: 3.8 MMOL/L — SIGNIFICANT CHANGE UP (ref 3.5–5.3)
POTASSIUM SERPL-SCNC: 3.8 MMOL/L — SIGNIFICANT CHANGE UP (ref 3.5–5.3)
PROT SERPL-MCNC: 7.7 G/DL — SIGNIFICANT CHANGE UP (ref 6–8.3)
RBC # BLD: 4.85 M/UL — SIGNIFICANT CHANGE UP (ref 3.8–5.2)
RBC # FLD: 12.9 % — SIGNIFICANT CHANGE UP (ref 10.3–14.5)
SODIUM SERPL-SCNC: 137 MMOL/L — SIGNIFICANT CHANGE UP (ref 135–145)
WBC # BLD: 4.72 K/UL — SIGNIFICANT CHANGE UP (ref 3.8–10.5)
WBC # FLD AUTO: 4.72 K/UL — SIGNIFICANT CHANGE UP (ref 3.8–10.5)

## 2023-09-20 PROCEDURE — 80053 COMPREHEN METABOLIC PANEL: CPT

## 2023-09-20 PROCEDURE — 87077 CULTURE AEROBIC IDENTIFY: CPT

## 2023-09-20 PROCEDURE — 86803 HEPATITIS C AB TEST: CPT

## 2023-09-20 PROCEDURE — 99285 EMERGENCY DEPT VISIT HI MDM: CPT

## 2023-09-20 PROCEDURE — 36415 COLL VENOUS BLD VENIPUNCTURE: CPT

## 2023-09-20 PROCEDURE — C1751: CPT

## 2023-09-20 PROCEDURE — 85610 PROTHROMBIN TIME: CPT

## 2023-09-20 PROCEDURE — 85730 THROMBOPLASTIN TIME PARTIAL: CPT

## 2023-09-20 PROCEDURE — 87040 BLOOD CULTURE FOR BACTERIA: CPT

## 2023-09-20 PROCEDURE — 80048 BASIC METABOLIC PNL TOTAL CA: CPT

## 2023-09-20 PROCEDURE — 93005 ELECTROCARDIOGRAM TRACING: CPT

## 2023-09-20 PROCEDURE — 87635 SARS-COV-2 COVID-19 AMP PRB: CPT

## 2023-09-20 PROCEDURE — 36573 INSJ PICC RS&I 5 YR+: CPT

## 2023-09-20 PROCEDURE — 83605 ASSAY OF LACTIC ACID: CPT

## 2023-09-20 PROCEDURE — 71045 X-RAY EXAM CHEST 1 VIEW: CPT

## 2023-09-20 PROCEDURE — 85025 COMPLETE CBC W/AUTO DIFF WBC: CPT

## 2023-09-20 PROCEDURE — 99239 HOSP IP/OBS DSCHRG MGMT >30: CPT

## 2023-09-20 PROCEDURE — 87186 SC STD MICRODIL/AGAR DIL: CPT

## 2023-09-20 PROCEDURE — 76937 US GUIDE VASCULAR ACCESS: CPT

## 2023-09-20 PROCEDURE — 99232 SBSQ HOSP IP/OBS MODERATE 35: CPT

## 2023-09-20 PROCEDURE — 96365 THER/PROPH/DIAG IV INF INIT: CPT

## 2023-09-20 PROCEDURE — 87086 URINE CULTURE/COLONY COUNT: CPT

## 2023-09-20 PROCEDURE — 81001 URINALYSIS AUTO W/SCOPE: CPT

## 2023-09-20 PROCEDURE — 85027 COMPLETE CBC AUTOMATED: CPT

## 2023-09-20 RX ORDER — ERTAPENEM SODIUM 1 G/1
1 INJECTION, POWDER, LYOPHILIZED, FOR SOLUTION INTRAMUSCULAR; INTRAVENOUS
Qty: 6 | Refills: 0
Start: 2023-09-20 | End: 2023-09-25

## 2023-09-20 RX ORDER — ERTAPENEM SODIUM 1 G/1
1 INJECTION, POWDER, LYOPHILIZED, FOR SOLUTION INTRAMUSCULAR; INTRAVENOUS
Qty: 0 | Refills: 0 | DISCHARGE
Start: 2023-09-20 | End: 2023-09-26

## 2023-09-20 RX ADMIN — ERTAPENEM SODIUM 120 MILLIGRAM(S): 1 INJECTION, POWDER, LYOPHILIZED, FOR SOLUTION INTRAMUSCULAR; INTRAVENOUS at 11:55

## 2023-09-20 RX ADMIN — Medication 650 MILLIGRAM(S): at 11:59

## 2023-09-20 RX ADMIN — Medication 250 MILLIGRAM(S): at 05:06

## 2023-09-20 RX ADMIN — LOSARTAN POTASSIUM 50 MILLIGRAM(S): 100 TABLET, FILM COATED ORAL at 05:07

## 2023-09-20 NOTE — PROGRESS NOTE ADULT - SUBJECTIVE AND OBJECTIVE BOX
Patient seen and examined at bedside. States she is anxious and nervous regarding administering the IV antibiotic at home. Reassured patient home care RN will come and teach her how to administer the antibiotic appropriately. Patient had midline placed today and had antibiotic run through midline without adverse event. Patient stable for discharge home.    Physical Exam:  GENERAL: patient appears well, no acute distress, appropriately interactive  EYES: sclera clear, no exudates  NECK: soft, no JVD  LUNGS: good air entry bilaterally, clear to auscultation, symmetric breath sounds, no wheezing or rhonchi appreciated  HEART: soft S1/S2, regular rate and rhythm, no murmurs noted, no lower extremity edema  GASTROINTESTINAL:  abdomen is soft, nondistended, normoactive bowel sounds.  EXTREMITIES: +trace LE edema b/l.  L. knee midline healed surgical scar.  MUSCULOSKELETAL: no clubbing or cyanosis, no obvious deformity  NEUROLOGIC: awake, alert, oriented x3.    Please refer to updated D/C note for further details.

## 2023-09-20 NOTE — PROGRESS NOTE ADULT - SUBJECTIVE AND OBJECTIVE BOX
[facility]   INFECTIOUS DISEASES   [address] [place] [address]  Tel: [phone]     Fax: [phone]  =========================================================  MD Arnoldo Palmer Kaushal, MD Cho, Michelle, MD Sunjit, Jaspal, MD  =========================================================    RADHA VICTORANN [id number]    Follow up: Ecoli bacteremia    No fever, frequency is better, comfortable, no flank or abdominal pain.   No nausea, vomiting or diarrhea, on regular diet.     Allergies:  tape (Rash)  No Known Drug Allergies    Medication:  acetaminophen     Tablet .. 650 milliGRAM(s) Oral every 6 hours PRN  acetaminophen     Tablet .. 650 milliGRAM(s) Oral every 6 hours PRN  enoxaparin Injectable 40 milliGRAM(s) SubCutaneous every 24 hours  ertapenem  IVPB 1000 milliGRAM(s) IV Intermittent every 24 hours  famotidine    Tablet 20 milliGRAM(s) Oral at bedtime  hydrochlorothiazide 25 milliGRAM(s) Oral daily  influenza  Vaccine (HIGH DOSE) 0.7 milliLiter(s) IntraMuscular once  losartan 50 milliGRAM(s) Oral daily  melatonin 3 milliGRAM(s) Oral at bedtime  polyethylene glycol 3350 17 Gram(s) Oral daily  saccharomyces boulardii 250 milliGRAM(s) Oral two times a day  sodium chloride 0.9%. 1000 milliLiter(s) IV Continuous <Continuous>     REVIEW OF SYSTEMS:  CONSTITUTIONAL:  No Fever or chills  HEENT:   No diplopia or blurred vision.  No earache, sore throat or runny nose.  CARDIOVASCULAR:  No chest pain or SOB  RESPIRATORY:  No cough, shortness of breath, PND or orthopnea.  GASTROINTESTINAL:  No nausea, vomiting or diarrhea.  GENITOURINARY:  No dysuria, frequency or urgency. No Blood in urine  MUSCULOSKELETAL:  no joint aches, no muscle pain  SKIN:  No change in skin, hair or nails.  NEUROLOGIC:  No paresthesias or weakness.  PSYCHIATRIC:  No disorder of thought or mood.  ENDOCRINE:  No heat or cold intolerance, polyuria or polydipsia.  HEMATOLOGICAL:  No easy bruising or bleeding.      Physical Exam:  Vital Signs Last 24 Hrs  T(C): 36.4 (20 Sep 2023 04:46), Max: 36.7 (19 Sep 2023 20:44)  T(F): 97.5 (20 Sep 2023 04:46), Max: 98 (19 Sep 2023 20:44)  HR: 73 (20 Sep 2023 04:46) (71 - 78)  BP: 122/79 (20 Sep 2023 04:46) (122/79 - 149/57)  BP(mean): --  RR: 18 (20 Sep 2023 04:46) (18 - 18)  SpO2: 94% (20 Sep 2023 04:46) (94% - 97%)  Parameters below as of 20 Sep 2023 04:46  Patient On (Oxygen Delivery Method): room air  GEN: NAD  HEENT: normocephalic and atraumatic. EOMI. BILLY.    NECK: Supple.  No lymphadenopathy   LUNGS: Clear to auscultation.  HEART: Regular rate and rhythm without murmur.  ABDOMEN: Soft, nontender, and nondistended.  Positive bowel sounds.    : No CVA tenderness  EXTREMITIES: Without edema.  MSK: no joint swelling  NEUROLOGIC: grossly intact.  PSYCHIATRIC: Appropriate affect .  SKIN: No rash       Labs:                        15.2   4.72  )-----------( 314      ( 20 Sep 2023 07:12 )             45.7     09-20    137  |  103  |  13  ----------------------------<  98  3.8   |  29  |  0.68    Ca    9.4      20 Sep 2023 07:12    TPro  7.7  /  Alb  3.7  /  TBili  0.6  /  DBili  x   /  AST  29  /  ALT  54  /  AlkPhos  65  09-20    Culture - Blood (collected 09-17-23 @ 11:45)  Source: .Blood Blood-Peripheral    Culture - Blood (collected 09-17-23 @ 11:30)  Source: .Blood Blood-Peripheral    Culture - Urine (collected 09-17-23 @ 11:30)  Source: Clean Catch Clean Catch (Midstream)    WBC Count: 4.72 K/uL (09-20-23 @ 07:12)  WBC Count: 4.19 K/uL (09-19-23 @ 05:40)  WBC Count: 4.14 K/uL (09-18-23 @ 07:00)  WBC Count: 4.60 K/uL (09-17-23 @ 11:30)    Creatinine: 0.68 mg/dL (09-20-23 @ 07:12)  Creatinine: 0.59 mg/dL (09-19-23 @ 05:40)  Creatinine: 0.59 mg/dL (09-18-23 @ 07:00)  Creatinine: 0.59 mg/dL (09-17-23 @ 11:30)     COVID-19 PCR: NotDetec (09-17-23 @ 11:30)    All imaging and data are reviewed.   < from: Xray Chest 1 View AP/PA (09.17.23 @ 12:29) >  ACC: [id number] EXAM:  XR CHEST AP OR PA 1V   ORDERED BY: CARMEN AGEE   PROCEDURE DATE:  09/17/2023    INTERPRETATION:  TIME OF EXAM: September 17, 2023 at 12:18 PM.  CLINICAL INFORMATION: Sepsis.  COMPARISON:  None available  TECHNIQUE:   AP Portable chest x-ray. Metallic artifacts from a bra   project over the image.  INTERPRETATION:  Heart size and the mediastinum are not accurately evaluated on this image.  The trachea is midline.  The lungs are clear.  No pleural effusion or pneumothorax is seen.  There is scoliosis of the spine.  IMPRESSION:  Clear lungs.

## 2023-09-20 NOTE — PROCEDURE NOTE - PROCEDURE FINDINGS AND DETAILS
___11__cm bard power midline inserted into patent left basilic vein under sterile conditions and ultrasound guidance.  Midline catheter can be accessed.

## 2023-09-20 NOTE — DISCHARGE NOTE NURSING/CASE MANAGEMENT/SOCIAL WORK - NSCORESITESY/N_GEN_A_CORE_RD
PATIENTS ONCOLOGY RECORD LOCATED IN [facility]      Subjective     Name:  MELVIN DUNCAN JR     Date:  2018  Address:  [address] [place] [address] [place] [facility]  Home: [phone]  :  1950 AGE:  68 y.o.        RECORDS OBTAINED:  Patients Oncology Record is located in [facility]   Yes

## 2023-09-20 NOTE — CASE MANAGEMENT PROGRESS NOTE - NSCMPROGRESSNOTE_GEN_ALL_CORE
Pt is transitioned to home with [facility] [phone]  with RN and IV home infusion. Anticipated start of care 9/21/23. Pt aware of plan and in agreement. CM discussed plan with MD and RN.

## 2023-09-20 NOTE — PROGRESS NOTE ADULT - ASSESSMENT
67y  Female with history of HTN, insomnia, GERD. Patient presents with continued dysuria, frequency, and urgency initially started in August. Patient reports a new sexual partner in June 2023 whom which she has vaginal intercourse. She developed a UTI in June and was treated by an urgent care center with Macrobid and then switched to Cipro. Developed another UTI in August, prescribed Cipro and then changed to Keflex also treated by an urgent care. Patient saw her GYN also and was tested fro STI's which she reports are negative. Her urinary symptoms initially improved and recently restarted after sexual intercourse in August. She reports she voids after intercourse, always wipes down, and was not told by her GYN that she has vaginal dryness. She was referred to a urologist and saw them in August who referred her to an ID doctor, one in [facility] who has not returned her messages, another in [place] who has no availability and finally was able to book an appointment in [place] for october. Due to her worsening symptoms and fever, urine cultures with multiple resistance she decided to come to the ER. Here she is afebrile, no leukocytosis. Started on Ertapenem.    ESBL Klebsiella pneumoniae UTI  Dysuria  Frequency     - Blood cultures NGTD   - Urine culture 9/5 with ESBL Klebsiella pneumoniae  - Urine culture from ED pending   - Midline in place   - s/p one dose fluconazole 150mg for possible candida VV.   - Continue Ertapenem 1gm daily  - last day would be 9/26   - No labs required  - Region care has been informed     Will sign off please call with any question.     Silvia Victor MD  Division of Infectious Diseases   Please call ID service at [phone] with any question.      50 minutes spent on total encounter assessing patient, examination, chart review, counseling and coordinating care by the attending physician/nurse/care manager.

## 2023-09-20 NOTE — DISCHARGE NOTE NURSING/CASE MANAGEMENT/SOCIAL WORK - PATIENT PORTAL LINK FT
You can access the FollowMyHealth Patient Portal offered by Health system by registering at the following website: [email or web address]. By joining [facility]’s FollowMyHealth portal, you will also be able to view your health information using other applications (apps) compatible with our system.
No

## 2023-09-21 LAB
-  AMIKACIN: SIGNIFICANT CHANGE UP
-  AMOXICILLIN/CLAVULANIC ACID: SIGNIFICANT CHANGE UP
-  AMPICILLIN/SULBACTAM: SIGNIFICANT CHANGE UP
-  AMPICILLIN: SIGNIFICANT CHANGE UP
-  AZTREONAM: SIGNIFICANT CHANGE UP
-  CEFAZOLIN: SIGNIFICANT CHANGE UP
-  CEFEPIME: SIGNIFICANT CHANGE UP
-  CEFTRIAXONE: SIGNIFICANT CHANGE UP
-  CEFUROXIME: SIGNIFICANT CHANGE UP
-  CIPROFLOXACIN: SIGNIFICANT CHANGE UP
-  ERTAPENEM: SIGNIFICANT CHANGE UP
-  GENTAMICIN: SIGNIFICANT CHANGE UP
-  IMIPENEM: SIGNIFICANT CHANGE UP
-  LEVOFLOXACIN: SIGNIFICANT CHANGE UP
-  MEROPENEM: SIGNIFICANT CHANGE UP
-  NITROFURANTOIN: SIGNIFICANT CHANGE UP
-  PIPERACILLIN/TAZOBACTAM: SIGNIFICANT CHANGE UP
-  TOBRAMYCIN: SIGNIFICANT CHANGE UP
-  TRIMETHOPRIM/SULFAMETHOXAZOLE: SIGNIFICANT CHANGE UP
CULTURE RESULTS: SIGNIFICANT CHANGE UP
METHOD TYPE: SIGNIFICANT CHANGE UP
ORGANISM # SPEC MICROSCOPIC CNT: SIGNIFICANT CHANGE UP
ORGANISM # SPEC MICROSCOPIC CNT: SIGNIFICANT CHANGE UP
SPECIMEN SOURCE: SIGNIFICANT CHANGE UP

## 2023-09-22 LAB
CULTURE RESULTS: SIGNIFICANT CHANGE UP
CULTURE RESULTS: SIGNIFICANT CHANGE UP
SPECIMEN SOURCE: SIGNIFICANT CHANGE UP
SPECIMEN SOURCE: SIGNIFICANT CHANGE UP

## 2023-12-22 ENCOUNTER — INPATIENT (INPATIENT)
Facility: HOSPITAL | Age: 68
LOS: 3 days | Discharge: HOME | DRG: 690 | End: 2023-12-26
Attending: HOSPITALIST | Admitting: INTERNAL MEDICINE
Payer: MEDICARE

## 2023-12-22 VITALS
WEIGHT: 195.11 LBS | OXYGEN SATURATION: 97 % | TEMPERATURE: 97 F | DIASTOLIC BLOOD PRESSURE: 83 MMHG | SYSTOLIC BLOOD PRESSURE: 165 MMHG | HEART RATE: 86 BPM | RESPIRATION RATE: 16 BRPM

## 2023-12-22 DIAGNOSIS — Z98.42 CATARACT EXTRACTION STATUS, LEFT EYE: Chronic | ICD-10-CM

## 2023-12-22 DIAGNOSIS — Z96.659 PRESENCE OF UNSPECIFIED ARTIFICIAL KNEE JOINT: Chronic | ICD-10-CM

## 2023-12-22 DIAGNOSIS — Z29.9 ENCOUNTER FOR PROPHYLACTIC MEASURES, UNSPECIFIED: ICD-10-CM

## 2023-12-22 DIAGNOSIS — I10 ESSENTIAL (PRIMARY) HYPERTENSION: ICD-10-CM

## 2023-12-22 DIAGNOSIS — N39.0 URINARY TRACT INFECTION, SITE NOT SPECIFIED: ICD-10-CM

## 2023-12-22 DIAGNOSIS — Z90.49 ACQUIRED ABSENCE OF OTHER SPECIFIED PARTS OF DIGESTIVE TRACT: Chronic | ICD-10-CM

## 2023-12-22 LAB
ALBUMIN SERPL ELPH-MCNC: 3.8 G/DL — SIGNIFICANT CHANGE UP (ref 3.3–5)
ALBUMIN SERPL ELPH-MCNC: 3.8 G/DL — SIGNIFICANT CHANGE UP (ref 3.3–5)
ALP SERPL-CCNC: 66 U/L — SIGNIFICANT CHANGE UP (ref 40–120)
ALP SERPL-CCNC: 66 U/L — SIGNIFICANT CHANGE UP (ref 40–120)
ALT FLD-CCNC: 29 U/L — SIGNIFICANT CHANGE UP (ref 12–78)
ALT FLD-CCNC: 29 U/L — SIGNIFICANT CHANGE UP (ref 12–78)
ANION GAP SERPL CALC-SCNC: 5 MMOL/L — SIGNIFICANT CHANGE UP (ref 5–17)
ANION GAP SERPL CALC-SCNC: 5 MMOL/L — SIGNIFICANT CHANGE UP (ref 5–17)
APPEARANCE UR: CLEAR — SIGNIFICANT CHANGE UP
APPEARANCE UR: CLEAR — SIGNIFICANT CHANGE UP
AST SERPL-CCNC: 18 U/L — SIGNIFICANT CHANGE UP (ref 15–37)
AST SERPL-CCNC: 18 U/L — SIGNIFICANT CHANGE UP (ref 15–37)
BASOPHILS # BLD AUTO: 0.05 K/UL — SIGNIFICANT CHANGE UP (ref 0–0.2)
BASOPHILS # BLD AUTO: 0.05 K/UL — SIGNIFICANT CHANGE UP (ref 0–0.2)
BASOPHILS NFR BLD AUTO: 0.8 % — SIGNIFICANT CHANGE UP (ref 0–2)
BASOPHILS NFR BLD AUTO: 0.8 % — SIGNIFICANT CHANGE UP (ref 0–2)
BILIRUB SERPL-MCNC: 0.4 MG/DL — SIGNIFICANT CHANGE UP (ref 0.2–1.2)
BILIRUB SERPL-MCNC: 0.4 MG/DL — SIGNIFICANT CHANGE UP (ref 0.2–1.2)
BILIRUB UR-MCNC: NEGATIVE — SIGNIFICANT CHANGE UP
BILIRUB UR-MCNC: NEGATIVE — SIGNIFICANT CHANGE UP
BUN SERPL-MCNC: 20 MG/DL — SIGNIFICANT CHANGE UP (ref 7–23)
BUN SERPL-MCNC: 20 MG/DL — SIGNIFICANT CHANGE UP (ref 7–23)
CALCIUM SERPL-MCNC: 9.4 MG/DL — SIGNIFICANT CHANGE UP (ref 8.5–10.1)
CALCIUM SERPL-MCNC: 9.4 MG/DL — SIGNIFICANT CHANGE UP (ref 8.5–10.1)
CHLORIDE SERPL-SCNC: 107 MMOL/L — SIGNIFICANT CHANGE UP (ref 96–108)
CHLORIDE SERPL-SCNC: 107 MMOL/L — SIGNIFICANT CHANGE UP (ref 96–108)
CO2 SERPL-SCNC: 27 MMOL/L — SIGNIFICANT CHANGE UP (ref 22–31)
CO2 SERPL-SCNC: 27 MMOL/L — SIGNIFICANT CHANGE UP (ref 22–31)
COLOR SPEC: YELLOW — SIGNIFICANT CHANGE UP
COLOR SPEC: YELLOW — SIGNIFICANT CHANGE UP
CREAT SERPL-MCNC: 0.78 MG/DL — SIGNIFICANT CHANGE UP (ref 0.5–1.3)
CREAT SERPL-MCNC: 0.78 MG/DL — SIGNIFICANT CHANGE UP (ref 0.5–1.3)
DIFF PNL FLD: NEGATIVE — SIGNIFICANT CHANGE UP
DIFF PNL FLD: NEGATIVE — SIGNIFICANT CHANGE UP
EGFR: 83 ML/MIN/1.73M2 — SIGNIFICANT CHANGE UP
EGFR: 83 ML/MIN/1.73M2 — SIGNIFICANT CHANGE UP
EOSINOPHIL # BLD AUTO: 0.05 K/UL — SIGNIFICANT CHANGE UP (ref 0–0.5)
EOSINOPHIL # BLD AUTO: 0.05 K/UL — SIGNIFICANT CHANGE UP (ref 0–0.5)
EOSINOPHIL NFR BLD AUTO: 0.8 % — SIGNIFICANT CHANGE UP (ref 0–6)
EOSINOPHIL NFR BLD AUTO: 0.8 % — SIGNIFICANT CHANGE UP (ref 0–6)
GLUCOSE SERPL-MCNC: 104 MG/DL — HIGH (ref 70–99)
GLUCOSE SERPL-MCNC: 104 MG/DL — HIGH (ref 70–99)
GLUCOSE UR QL: NEGATIVE MG/DL — SIGNIFICANT CHANGE UP
GLUCOSE UR QL: NEGATIVE MG/DL — SIGNIFICANT CHANGE UP
HCT VFR BLD CALC: 43 % — SIGNIFICANT CHANGE UP (ref 34.5–45)
HCT VFR BLD CALC: 43 % — SIGNIFICANT CHANGE UP (ref 34.5–45)
HGB BLD-MCNC: 14.8 G/DL — SIGNIFICANT CHANGE UP (ref 11.5–15.5)
HGB BLD-MCNC: 14.8 G/DL — SIGNIFICANT CHANGE UP (ref 11.5–15.5)
IMM GRANULOCYTES NFR BLD AUTO: 0.6 % — SIGNIFICANT CHANGE UP (ref 0–0.9)
IMM GRANULOCYTES NFR BLD AUTO: 0.6 % — SIGNIFICANT CHANGE UP (ref 0–0.9)
KETONES UR-MCNC: NEGATIVE MG/DL — SIGNIFICANT CHANGE UP
KETONES UR-MCNC: NEGATIVE MG/DL — SIGNIFICANT CHANGE UP
LACTATE SERPL-SCNC: 0.7 MMOL/L — SIGNIFICANT CHANGE UP (ref 0.7–2)
LACTATE SERPL-SCNC: 0.7 MMOL/L — SIGNIFICANT CHANGE UP (ref 0.7–2)
LEUKOCYTE ESTERASE UR-ACNC: ABNORMAL
LEUKOCYTE ESTERASE UR-ACNC: ABNORMAL
LYMPHOCYTES # BLD AUTO: 0.8 K/UL — LOW (ref 1–3.3)
LYMPHOCYTES # BLD AUTO: 0.8 K/UL — LOW (ref 1–3.3)
LYMPHOCYTES # BLD AUTO: 12.5 % — LOW (ref 13–44)
LYMPHOCYTES # BLD AUTO: 12.5 % — LOW (ref 13–44)
MCHC RBC-ENTMCNC: 31.9 PG — SIGNIFICANT CHANGE UP (ref 27–34)
MCHC RBC-ENTMCNC: 31.9 PG — SIGNIFICANT CHANGE UP (ref 27–34)
MCHC RBC-ENTMCNC: 34.4 GM/DL — SIGNIFICANT CHANGE UP (ref 32–36)
MCHC RBC-ENTMCNC: 34.4 GM/DL — SIGNIFICANT CHANGE UP (ref 32–36)
MCV RBC AUTO: 92.7 FL — SIGNIFICANT CHANGE UP (ref 80–100)
MCV RBC AUTO: 92.7 FL — SIGNIFICANT CHANGE UP (ref 80–100)
MONOCYTES # BLD AUTO: 0.4 K/UL — SIGNIFICANT CHANGE UP (ref 0–0.9)
MONOCYTES # BLD AUTO: 0.4 K/UL — SIGNIFICANT CHANGE UP (ref 0–0.9)
MONOCYTES NFR BLD AUTO: 6.3 % — SIGNIFICANT CHANGE UP (ref 2–14)
MONOCYTES NFR BLD AUTO: 6.3 % — SIGNIFICANT CHANGE UP (ref 2–14)
NEUTROPHILS # BLD AUTO: 5.05 K/UL — SIGNIFICANT CHANGE UP (ref 1.8–7.4)
NEUTROPHILS # BLD AUTO: 5.05 K/UL — SIGNIFICANT CHANGE UP (ref 1.8–7.4)
NEUTROPHILS NFR BLD AUTO: 79 % — HIGH (ref 43–77)
NEUTROPHILS NFR BLD AUTO: 79 % — HIGH (ref 43–77)
NITRITE UR-MCNC: NEGATIVE — SIGNIFICANT CHANGE UP
NITRITE UR-MCNC: NEGATIVE — SIGNIFICANT CHANGE UP
NRBC # BLD: 0 /100 WBCS — SIGNIFICANT CHANGE UP (ref 0–0)
NRBC # BLD: 0 /100 WBCS — SIGNIFICANT CHANGE UP (ref 0–0)
PH UR: 6 — SIGNIFICANT CHANGE UP (ref 5–8)
PH UR: 6 — SIGNIFICANT CHANGE UP (ref 5–8)
PLATELET # BLD AUTO: 319 K/UL — SIGNIFICANT CHANGE UP (ref 150–400)
PLATELET # BLD AUTO: 319 K/UL — SIGNIFICANT CHANGE UP (ref 150–400)
POTASSIUM SERPL-MCNC: 3.9 MMOL/L — SIGNIFICANT CHANGE UP (ref 3.5–5.3)
POTASSIUM SERPL-MCNC: 3.9 MMOL/L — SIGNIFICANT CHANGE UP (ref 3.5–5.3)
POTASSIUM SERPL-SCNC: 3.9 MMOL/L — SIGNIFICANT CHANGE UP (ref 3.5–5.3)
POTASSIUM SERPL-SCNC: 3.9 MMOL/L — SIGNIFICANT CHANGE UP (ref 3.5–5.3)
PROT SERPL-MCNC: 7.9 G/DL — SIGNIFICANT CHANGE UP (ref 6–8.3)
PROT SERPL-MCNC: 7.9 G/DL — SIGNIFICANT CHANGE UP (ref 6–8.3)
PROT UR-MCNC: NEGATIVE MG/DL — SIGNIFICANT CHANGE UP
PROT UR-MCNC: NEGATIVE MG/DL — SIGNIFICANT CHANGE UP
RBC # BLD: 4.64 M/UL — SIGNIFICANT CHANGE UP (ref 3.8–5.2)
RBC # BLD: 4.64 M/UL — SIGNIFICANT CHANGE UP (ref 3.8–5.2)
RBC # FLD: 12.5 % — SIGNIFICANT CHANGE UP (ref 10.3–14.5)
RBC # FLD: 12.5 % — SIGNIFICANT CHANGE UP (ref 10.3–14.5)
SODIUM SERPL-SCNC: 139 MMOL/L — SIGNIFICANT CHANGE UP (ref 135–145)
SODIUM SERPL-SCNC: 139 MMOL/L — SIGNIFICANT CHANGE UP (ref 135–145)
SP GR SPEC: 1.01 — SIGNIFICANT CHANGE UP (ref 1–1.03)
SP GR SPEC: 1.01 — SIGNIFICANT CHANGE UP (ref 1–1.03)
UROBILINOGEN FLD QL: 0.2 MG/DL — SIGNIFICANT CHANGE UP (ref 0.2–1)
UROBILINOGEN FLD QL: 0.2 MG/DL — SIGNIFICANT CHANGE UP (ref 0.2–1)
WBC # BLD: 6.39 K/UL — SIGNIFICANT CHANGE UP (ref 3.8–10.5)
WBC # BLD: 6.39 K/UL — SIGNIFICANT CHANGE UP (ref 3.8–10.5)
WBC # FLD AUTO: 6.39 K/UL — SIGNIFICANT CHANGE UP (ref 3.8–10.5)
WBC # FLD AUTO: 6.39 K/UL — SIGNIFICANT CHANGE UP (ref 3.8–10.5)

## 2023-12-22 PROCEDURE — 74176 CT ABD & PELVIS W/O CONTRAST: CPT | Mod: 26,MA

## 2023-12-22 PROCEDURE — 99222 1ST HOSP IP/OBS MODERATE 55: CPT

## 2023-12-22 PROCEDURE — 99222 1ST HOSP IP/OBS MODERATE 55: CPT | Mod: GC

## 2023-12-22 PROCEDURE — 99285 EMERGENCY DEPT VISIT HI MDM: CPT

## 2023-12-22 RX ORDER — L.ACIDOPH/B.ANIMALIS/B.LONGUM 15B CELL
1 CAPSULE ORAL
Qty: 0 | Refills: 0 | DISCHARGE

## 2023-12-22 RX ORDER — LOSARTAN POTASSIUM 100 MG/1
50 TABLET, FILM COATED ORAL DAILY
Refills: 0 | Status: DISCONTINUED | OUTPATIENT
Start: 2023-12-22 | End: 2023-12-26

## 2023-12-22 RX ORDER — LACTOBACILLUS ACIDOPHILUS 100MM CELL
1 CAPSULE ORAL DAILY
Refills: 0 | Status: DISCONTINUED | OUTPATIENT
Start: 2023-12-22 | End: 2023-12-26

## 2023-12-22 RX ORDER — ACETAMINOPHEN 500 MG
650 TABLET ORAL EVERY 6 HOURS
Refills: 0 | Status: DISCONTINUED | OUTPATIENT
Start: 2023-12-22 | End: 2023-12-26

## 2023-12-22 RX ORDER — PIPERACILLIN AND TAZOBACTAM 4; .5 G/20ML; G/20ML
3.38 INJECTION, POWDER, LYOPHILIZED, FOR SOLUTION INTRAVENOUS ONCE
Refills: 0 | Status: COMPLETED | OUTPATIENT
Start: 2023-12-22 | End: 2023-12-22

## 2023-12-22 RX ORDER — ENOXAPARIN SODIUM 100 MG/ML
40 INJECTION SUBCUTANEOUS EVERY 24 HOURS
Refills: 0 | Status: DISCONTINUED | OUTPATIENT
Start: 2023-12-22 | End: 2023-12-26

## 2023-12-22 RX ORDER — ERTAPENEM SODIUM 1 G/1
1000 INJECTION, POWDER, LYOPHILIZED, FOR SOLUTION INTRAMUSCULAR; INTRAVENOUS EVERY 24 HOURS
Refills: 0 | Status: DISCONTINUED | OUTPATIENT
Start: 2023-12-22 | End: 2023-12-26

## 2023-12-22 RX ORDER — LANOLIN ALCOHOL/MO/W.PET/CERES
1 CREAM (GRAM) TOPICAL
Refills: 0 | DISCHARGE

## 2023-12-22 RX ORDER — HYDROCHLOROTHIAZIDE 25 MG
1 TABLET ORAL
Refills: 0 | DISCHARGE

## 2023-12-22 RX ORDER — SODIUM CHLORIDE 9 MG/ML
1000 INJECTION INTRAMUSCULAR; INTRAVENOUS; SUBCUTANEOUS
Refills: 0 | Status: DISCONTINUED | OUTPATIENT
Start: 2023-12-22 | End: 2023-12-24

## 2023-12-22 RX ORDER — IRBESARTAN 75 MG/1
1 TABLET ORAL
Refills: 0 | DISCHARGE

## 2023-12-22 RX ORDER — VANCOMYCIN HCL 1 G
125 VIAL (EA) INTRAVENOUS DAILY
Refills: 0 | Status: DISCONTINUED | OUTPATIENT
Start: 2023-12-22 | End: 2023-12-26

## 2023-12-22 RX ORDER — LANOLIN ALCOHOL/MO/W.PET/CERES
3 CREAM (GRAM) TOPICAL AT BEDTIME
Refills: 0 | Status: DISCONTINUED | OUTPATIENT
Start: 2023-12-22 | End: 2023-12-26

## 2023-12-22 RX ORDER — MEROPENEM 1 G/30ML
1000 INJECTION INTRAVENOUS EVERY 8 HOURS
Refills: 0 | Status: DISCONTINUED | OUTPATIENT
Start: 2023-12-22 | End: 2023-12-22

## 2023-12-22 RX ORDER — ONDANSETRON 8 MG/1
4 TABLET, FILM COATED ORAL EVERY 8 HOURS
Refills: 0 | Status: DISCONTINUED | OUTPATIENT
Start: 2023-12-22 | End: 2023-12-26

## 2023-12-22 RX ORDER — VANCOMYCIN HCL 1 G
125 VIAL (EA) INTRAVENOUS DAILY
Refills: 0 | Status: DISCONTINUED | OUTPATIENT
Start: 2023-12-22 | End: 2023-12-22

## 2023-12-22 RX ORDER — SODIUM CHLORIDE 9 MG/ML
1000 INJECTION, SOLUTION INTRAVENOUS ONCE
Refills: 0 | Status: COMPLETED | OUTPATIENT
Start: 2023-12-22 | End: 2023-12-22

## 2023-12-22 RX ORDER — FAMOTIDINE 10 MG/ML
1 INJECTION INTRAVENOUS
Refills: 0 | DISCHARGE

## 2023-12-22 RX ADMIN — ERTAPENEM SODIUM 120 MILLIGRAM(S): 1 INJECTION, POWDER, LYOPHILIZED, FOR SOLUTION INTRAMUSCULAR; INTRAVENOUS at 18:59

## 2023-12-22 RX ADMIN — SODIUM CHLORIDE 1000 MILLILITER(S): 9 INJECTION, SOLUTION INTRAVENOUS at 13:41

## 2023-12-22 RX ADMIN — PIPERACILLIN AND TAZOBACTAM 200 GRAM(S): 4; .5 INJECTION, POWDER, LYOPHILIZED, FOR SOLUTION INTRAVENOUS at 13:39

## 2023-12-22 RX ADMIN — SODIUM CHLORIDE 75 MILLILITER(S): 9 INJECTION INTRAMUSCULAR; INTRAVENOUS; SUBCUTANEOUS at 16:29

## 2023-12-22 RX ADMIN — Medication 125 MILLIGRAM(S): at 19:00

## 2023-12-22 NOTE — H&P ADULT - HISTORY OF PRESENT ILLNESS
Patient is a 68-year-old female with recurrent UTIs and HTN presenting to the ED for complaints of dysuria.  Patient reports that start 5 days ago, she had burning with urination that kept her up all night as well as heartburn and nausea.  Patient reports that she's had this multiple in the past.  Of note, patient was recently hospitalized in September and was discharged with a PICC line and IV abx.  Patient states she visited her urologist 3 days ago for her symptoms and was told to present to the ED for treatment today as cultures came back positive for ESBL.        In the ED pts VS were T(C): 36.3  T(F): 97.3  HR: 86  BP: 165/83  RR: 16  SpO2: 97%  Labs show: H.8 WBC: 6.39 Plt: 319  Creatinine: 0.78, Negative UA  CT scans shows No renal stones or hydronephrosis.  EKG shows NSR w/ incomplete RBBB    Pt is admitted for further workup and management

## 2023-12-22 NOTE — H&P ADULT - ATTENDING COMMENTS
68-year-old female with recurrent UTIs and HTN presenting to the ED for complaints of dysuria, admitted for UTI.     Pt started on Ertapenem as per ID recs, will f/u final cutlure results, and monitor WBC and fever curve.    Kelvin Newton, Attending Physician

## 2023-12-22 NOTE — ED PROVIDER NOTE - LIVES WITH, PROFILE
[FreeTextEntry1] : MAEGAN ALEJANDRA has 4 weeks of foreign body sensation in her throat. This probably represent right lingual tonsillitis secondary to LPR. I suggested and discussed in detail a strict reflux diet and gave the patient a handout.\par I am recommending Prilosec 20 mg 30-40 minutes before breakfast on an empty stomach.\par I am recommending Zantac 150  before bedtime.\par \par RTC 4 weeks. alone

## 2023-12-22 NOTE — ED ADULT NURSE NOTE - OBJECTIVE STATEMENT
pt ambulatory from triage a&ox4 with c/o UTI. sent from MD - told to come to ED for urine test/CT scan. told +klebsiella in urine. +lower abdominal discomfort, diarrhea, and burning with urination. 5th UTI in 6 months. skin warm and dry. afebrile

## 2023-12-22 NOTE — CONSULT NOTE ADULT - SUBJECTIVE AND OBJECTIVE BOX
[facility]  INFECTIOUS DISEASES - [address], [place] [address]  Tel: [phone]     Fax: [phone]  =======================================================    [id number]  TANIA VALENTE     CC: Patient is a 68y old  Female who presents with a chief complaint of     HPI:  68-year-old female with hx of UTI's, c diff infection (2023), who presented with dysuria and lower abdominal pain. Reports LUTS for the past 3 days. Had recent urine cultures collected by urologist which grew ESBL Klebsiella. Denies any fevers or chills. Of note was here in 2023 and treated for ESBL Klebsiella UTI.         PAST MEDICAL & SURGICAL HISTORY:  HTN (hypertension)      S/P appy      S/P knee replacement  left      S/P cataract surgery, left          Social Hx:     FAMILY HISTORY:      Allergies    No Known Drug Allergies  tape (Rash)    Intolerances        Antibiotics:  MEDICATIONS  (STANDING):  enoxaparin Injectable 40 milliGRAM(s) SubCutaneous every 24 hours  ertapenem  IVPB 1000 milliGRAM(s) IV Intermittent every 24 hours  hydrochlorothiazide 25 milliGRAM(s) Oral daily  losartan 50 milliGRAM(s) Oral daily  sodium chloride 0.9%. 1000 milliLiter(s) (75 mL/Hr) IV Continuous <Continuous>  vancomycin    Solution 125 milliGRAM(s) Oral daily    MEDICATIONS  (PRN):  acetaminophen     Tablet .. 650 milliGRAM(s) Oral every 6 hours PRN Temp greater or equal to 38C (100.4F), Mild Pain (1 - 3)  aluminum hydroxide/magnesium hydroxide/simethicone Suspension 30 milliLiter(s) Oral every 4 hours PRN Dyspepsia  melatonin 3 milliGRAM(s) Oral at bedtime PRN Insomnia  ondansetron Injectable 4 milliGRAM(s) IV Push every 8 hours PRN Nausea and/or Vomiting       REVIEW OF SYSTEMS:  CONSTITUTIONAL:  No Fever or chills  HEENT:  No sore throat or runny nose.  CARDIOVASCULAR:  No chest pain or SOB.  RESPIRATORY:  No cough, shortness of breath  GASTROINTESTINAL:  No nausea, vomiting or diarrhea.  GENITOURINARY:  see history  MUSCULOSKELETAL:  no joint aches, no muscle pain  NEUROLOGIC:  No headache or dizziness  PSYCHIATRIC:  No disorder of thought or mood.    Physical Exam:  Vital Signs Last 24 Hrs  T(C): 36.3 (22 Dec 2023 11:26), Max: 36.3 (22 Dec 2023 11:)  T(F): 97.3 (22 Dec 2023 11:), Max: 97.3 (22 Dec 2023 11:)  HR: 86 (22 Dec 2023 11:) (86 - 86)  BP: 165/83 (22 Dec 2023 11:) (165/83 - 165/83)  BP(mean): --  RR: 16 (22 Dec 2023 11:) (16 - 16)  SpO2: 97% (22 Dec 2023 11:) (97% - 97%)    Parameters below as of 22 Dec 2023 11:26  Patient On (Oxygen Delivery Method): room air        Weight (kg): 88.5 ( @ 11:26)  GEN: NAD  HEENT: normocephalic and atraumatic.   NECK: Supple.   LUNGS: Normal respiratory effort  HEART: Regular rate and rhythm   ABDOMEN: Soft, nontender, and nondistended.    EXTREMITIES: No leg edema.  NEUROLOGIC: grossly intact.  PSYCHIATRIC: Appropriate affect .      Labs:      139  |  107  |  20  ----------------------------<  104<H>  3.9   |  27  |  0.78    Ca    9.4      22 Dec 2023 12:45    TPro  7.9  /  Alb  3.8  /  TBili  0.4  /  DBili  x   /  AST  18  /  ALT  29  /  AlkPhos  66                            14.8   6.39  )-----------( 319      ( 22 Dec 2023 12:45 )             43.0       Urinalysis Basic - ( 22 Dec 2023 12:45 )    Color: Yellow / Appearance: Clear / S.008 / pH: x  Gluc: 104 mg/dL / Ketone: Negative mg/dL  / Bili: Negative / Urobili: 0.2 mg/dL   Blood: x / Protein: Negative mg/dL / Nitrite: Negative   Leuk Esterase: Small / RBC: 0 /HPF / WBC 5 /HPF   Sq Epi: x / Non Sq Epi: x / Bacteria: Many /HPF      LIVER FUNCTIONS - ( 22 Dec 2023 12:45 )  Alb: 3.8 g/dL / Pro: 7.9 g/dL / ALK PHOS: 66 U/L / ALT: 29 U/L / AST: 18 U/L / GGT: x                           RECENT CULTURES:        All imaging and other data have been reviewed.    < from: CT Renal Stone Hunt (23 @ 13:06) >  FINDINGS:  LOWER CHEST: Within normal limits.    Please note that evaluation of the abdominal organs and vascular   structures is limited by lack of intravenous contrast.    LIVER: Posterior right hepatic hypodensity, likely cyst.  BILE DUCTS: Normal caliber.  GALLBLADDER: Within normal limits.  SPLEEN: Within normal limits.  PANCREAS: Within normal limits.  ADRENALS: Within normal limits.  KIDNEYS/URETERS: No renal stones or hydronephrosis.    BLADDER: Within normal limits.  REPRODUCTIVE ORGANS: Uterus and adnexa within normal limits.    BOWEL: No bowel obstruction. Appendix is surgically absent.  PERITONEUM: No ascites.  VESSELS: Minimal atherosclerotic calcifications.  RETROPERITONEUM/LYMPH NODES: No lymphadenopathy.  ABDOMINAL WALL: Small fat-containing umbilical hernia.  BONES: Mild degenerative changes.    IMPRESSION:  No renal stones or hydronephrosis.      < end of copied text >   [facility]  INFECTIOUS DISEASES - [address], [place] [address]  Tel: [phone]     Fax: [phone]  =======================================================    [id number]  TANIA VALENTE     CC: Patient is a 68y old  Female who presents with a chief complaint of     HPI:  68-year-old female with hx of UTI's, c diff infection (2023), who presented with dysuria and lower abdominal pain. Reports LUTS for the past 3 days. Had recent urine cultures collected by urologist which grew ESBL Klebsiella. Denies any fevers or chills. Of note was here in 2023 and treated for ESBL Klebsiella UTI.         PAST MEDICAL & SURGICAL HISTORY:  HTN (hypertension)      S/P appy      S/P knee replacement  left      S/P cataract surgery, left          Social Hx:     FAMILY HISTORY:      Allergies    No Known Drug Allergies  tape (Rash)    Intolerances        Antibiotics:  MEDICATIONS  (STANDING):  enoxaparin Injectable 40 milliGRAM(s) SubCutaneous every 24 hours  ertapenem  IVPB 1000 milliGRAM(s) IV Intermittent every 24 hours  hydrochlorothiazide 25 milliGRAM(s) Oral daily  losartan 50 milliGRAM(s) Oral daily  sodium chloride 0.9%. 1000 milliLiter(s) (75 mL/Hr) IV Continuous <Continuous>  vancomycin    Solution 125 milliGRAM(s) Oral daily    MEDICATIONS  (PRN):  acetaminophen     Tablet .. 650 milliGRAM(s) Oral every 6 hours PRN Temp greater or equal to 38C (100.4F), Mild Pain (1 - 3)  aluminum hydroxide/magnesium hydroxide/simethicone Suspension 30 milliLiter(s) Oral every 4 hours PRN Dyspepsia  melatonin 3 milliGRAM(s) Oral at bedtime PRN Insomnia  ondansetron Injectable 4 milliGRAM(s) IV Push every 8 hours PRN Nausea and/or Vomiting       REVIEW OF SYSTEMS:  CONSTITUTIONAL:  No Fever or chills  HEENT:  No sore throat or runny nose.  CARDIOVASCULAR:  No chest pain or SOB.  RESPIRATORY:  No cough, shortness of breath  GASTROINTESTINAL:  No nausea, vomiting or diarrhea.  GENITOURINARY:  see history  MUSCULOSKELETAL:  no joint aches, no muscle pain  NEUROLOGIC:  No headache or dizziness  PSYCHIATRIC:  No disorder of thought or mood.    Physical Exam:  Vital Signs Last 24 Hrs  T(C): 36.3 (22 Dec 2023 11:26), Max: 36.3 (22 Dec 2023 11:)  T(F): 97.3 (22 Dec 2023 11:), Max: 97.3 (22 Dec 2023 11:)  HR: 86 (22 Dec 2023 11:) (86 - 86)  BP: 165/83 (22 Dec 2023 11:) (165/83 - 165/83)  BP(mean): --  RR: 16 (22 Dec 2023 11:) (16 - 16)  SpO2: 97% (22 Dec 2023 11:) (97% - 97%)    Parameters below as of 22 Dec 2023 11:26  Patient On (Oxygen Delivery Method): room air        Weight (kg): 88.5 ( @ 11:26)  GEN: NAD  HEENT: normocephalic and atraumatic.   NECK: Supple.   LUNGS: Normal respiratory effort  HEART: Regular rate and rhythm   ABDOMEN: Soft, nontender, and nondistended.    EXTREMITIES: No leg edema.  NEUROLOGIC: grossly intact.  PSYCHIATRIC: Appropriate affect .      Labs:      139  |  107  |  20  ----------------------------<  104<H>  3.9   |  27  |  0.78    Ca    9.4      22 Dec 2023 12:45    TPro  7.9  /  Alb  3.8  /  TBili  0.4  /  DBili  x   /  AST  18  /  ALT  29  /  AlkPhos  66                            14.8   6.39  )-----------( 319      ( 22 Dec 2023 12:45 )             43.0       Urinalysis Basic - ( 22 Dec 2023 12:45 )    Color: Yellow / Appearance: Clear / S.008 / pH: x  Gluc: 104 mg/dL / Ketone: Negative mg/dL  / Bili: Negative / Urobili: 0.2 mg/dL   Blood: x / Protein: Negative mg/dL / Nitrite: Negative   Leuk Esterase: Small / RBC: 0 /HPF / WBC 5 /HPF   Sq Epi: x / Non Sq Epi: x / Bacteria: Many /HPF      LIVER FUNCTIONS - ( 22 Dec 2023 12:45 )  Alb: 3.8 g/dL / Pro: 7.9 g/dL / ALK PHOS: 66 U/L / ALT: 29 U/L / AST: 18 U/L / GGT: x                           RECENT CULTURES:        All imaging and other data have been reviewed.    < from: CT Renal Stone Hunt (23 @ 13:06) >  FINDINGS:  LOWER CHEST: Within normal limits.    Please note that evaluation of the abdominal organs and vascular   structures is limited by lack of intravenous contrast.    LIVER: Posterior right hepatic hypodensity, likely cyst.  BILE DUCTS: Normal caliber.  GALLBLADDER: Within normal limits.  SPLEEN: Within normal limits.  PANCREAS: Within normal limits.  ADRENALS: Within normal limits.  KIDNEYS/URETERS: No renal stones or hydronephrosis.    BLADDER: Within normal limits.  REPRODUCTIVE ORGANS: Uterus and adnexa within normal limits.    BOWEL: No bowel obstruction. Appendix is surgically absent.  PERITONEUM: No ascites.  VESSELS: Minimal atherosclerotic calcifications.  RETROPERITONEUM/LYMPH NODES: No lymphadenopathy.  ABDOMINAL WALL: Small fat-containing umbilical hernia.  BONES: Mild degenerative changes.    IMPRESSION:  No renal stones or hydronephrosis.      < end of copied text >

## 2023-12-22 NOTE — H&P ADULT - PROBLEM SELECTOR PLAN 1
- history of frequent UTI's  - past urine culture in September positive for klebsiella ESBL, sensitive in HIE  - UA negative on admission, outpatient urologists to send culture data/ sensitivites  - CT renal hunt negative  - lactate negative  - s/p Zosyn, fluid bolus in ED  - c/w gentle IVF  - f/u urine culture  - f/u blood culture  - start ertapenem IVPB  - start vancomycin oral for C Diff ppx  - Start lactobacillus oral  - ID consulted, recs appreciated - history of frequent UTI's  - past urine culture in September positive for klebsiella ESBL, sensitive in HIE  - UA negative on admission,   - outpatient urologists to send culture data/ sensitivites (), f/u  - CT renal hunt negative  - lactate negative  - s/p Zosyn, fluid bolus in ED  - c/w gentle IVF  - f/u urine culture  - f/u blood culture  - start ertapenem IVPB  - start vancomycin oral for C Diff ppx  - Start lactobacillus oral  - ID consulted, recs appreciated

## 2023-12-22 NOTE — CONSULT NOTE ADULT - ASSESSMENT
68-year-old female with hx of UTI's, c diff infection (9/2023), who presented with dysuria and lower abdominal pain. Reports LUTS for the past 3 days. Had recent urine cultures collected by urologist which grew ESBL Klebsiella. No fevers and no leukocytosis. CT showed no renal stones or hydronephrosis.         #ESBL klebsiella UTI  #Hx of c diff infection    -suggest ertapenem 1g IV q24h  -suggest vancomycin 125mg PO daily for c diff ppx  -follow blood and urine cultures  -will likely need midline    Thank you for courtesy of this consult.     Will follow.  Discussed with the primary team.     Kyung Godinez MD  Division of Infectious Diseases   Cell [phone] between 8am and 6pm   After 6pm and weekends please call ID service at [phone].     55 minutes spent on total encounter assessing patient, examination, chart review, counseling and coordinating care by the attending physician/nurse/care manager.    68-year-old female with hx of UTI's, c diff infection (9/2023), who presented with dysuria and lower abdominal pain. Reports LUTS for the past 3 days. Had recent urine cultures collected by urologist which grew ESBL Klebsiella. No fevers and no leukocytosis. CT showed no renal stones or hydronephrosis.         #ESBL klebsiella UTI  #Hx of c diff infection    -suggest ertapenem 1g IV q24h  -suggest vancomycin 125mg PO daily for c diff ppx  -follow blood and urine cultures  -will likely need midline    Thank you for courtesy of this consult.     Will follow.  Discussed with the primary team.     Kyung Godinez MD  Division of Infectious Diseases   Cell [phone] between 8am and 6pm   After 6pm and weekends please call ID service at [phone].     55 minutes spent on total encounter assessing patient, examination, chart review, counseling and coordinating care by the attending physician/nurse/care manager.    68-year-old female with hx of UTI's, c diff infection (9/2023), who presented with dysuria and lower abdominal pain. Reports LUTS for the past 3 days. Had recent urine cultures collected by urologist which grew ESBL Klebsiella. No fevers and no leukocytosis. CT showed no renal stones or hydronephrosis.         #ESBL klebsiella UTI  #Hx of c diff infection    -suggest ertapenem 1g IV q24h  -suggest vancomycin 125mg PO daily for c diff ppx  -follow blood and urine cultures  -will likely need midline--explained to patient that this likely cannot be done until next week due to long holiday weekend    Thank you for courtesy of this consult.     Will follow.  Discussed with the primary team.     Kyung Godinez MD  Division of Infectious Diseases   Cell [phone] between 8am and 6pm   After 6pm and weekends please call ID service at [phone].     55 minutes spent on total encounter assessing patient, examination, chart review, counseling and coordinating care by the attending physician/nurse/care manager.    68-year-old female with hx of UTI's, c diff infection (9/2023), who presented with dysuria and lower abdominal pain. Reports LUTS for the past 3 days. Had recent urine cultures collected by urologist which grew ESBL Klebsiella. No fevers and no leukocytosis. CT showed no renal stones or hydronephrosis.         #ESBL klebsiella UTI  #Hx of c diff infection    -suggest ertapenem 1g IV q24h  -suggest vancomycin 125mg PO daily for c diff ppx  -follow blood and urine cultures  -will likely need midline--explained to patient that this likely cannot be done until next week due to long holiday weekend    Thank you for courtesy of this consult.     Will follow.  Discussed with the primary team.     Kyung Godinez MD  Division of Infectious Diseases   Cell [phone] between 8am and 6pm   After 6pm and weekends please call ID service at [phone].     55 minutes spent on total encounter assessing patient, examination, chart review, counseling and coordinating care by the attending physician/nurse/care manager.

## 2023-12-22 NOTE — H&P ADULT - NSHPSOCIALHISTORY_GEN_ALL_CORE
Lives at home, retired  Daily glass of wine, no history of withdrawals  Denies tobacco use  Denies illicit drug use  Ambulates without assistance

## 2023-12-22 NOTE — H&P ADULT - ASSESSMENT
Patient is a 68-year-old female with recurrent UTIs and HTN presenting to the ED for complaints of dysuria, admitted for UTI.

## 2023-12-22 NOTE — ED PROVIDER NOTE - OBJECTIVE STATEMENT
Patient is a 68-year-old white female with longstanding history of urinary tract infections and recently refractory UTI growing Klebsiella most recently hospitalized September at which time PICC line was placed and received IV therapy at home here now with continued dysuria frequency urgency.  Went to see urologist Dr. Sudhakar Marrero who determined that patient was still growing out Klebsiella in the urine and suggested that patient come here for further evaluation treatment and management and possible hospitalization for IV antimicrobial therapy.

## 2023-12-22 NOTE — ED PROVIDER NOTE - PROGRESS NOTE DETAILS
Case reviewed in depth with Dr. Sudhakar Marrero patient's urologist and patient will be admitted to the hospitalist service here with refractory Klebsiella UTI.

## 2023-12-22 NOTE — ED PROVIDER NOTE - PHYSICAL EXAMINATION
Examination reveals a well-developed well-nourished white female in no acute distress with clear lungs normal heart sounds benign soft nontender abdomen no CVA tenderness extremities that are free from any clubbing cyanosis or edema and neurologic evaluation alert and oriented x 4 without any focal neurologic deficits.  Skin is warm and dry.

## 2023-12-22 NOTE — H&P ADULT - NSHPREVIEWOFSYSTEMS_GEN_ALL_CORE
CONSTITUTIONAL: denies fever, chills, fatigue, weakness  HEENT: denies blurred vision, sore throat  SKIN: denies new lesions, rash  CARDIOVASCULAR: denies chest pain, chest pressure, palpitations  RESPIRATORY: denies shortness of breath, sputum production  GASTROINTESTINAL: endorses nausea, denies vomiting, diarrhea, abdominal pain  GENITOURINARY: endorses dysuria  NEUROLOGICAL: denies numbness, headache, focal weakness  MUSCULOSKELETAL: denies new joint pain, muscle aches  HEMATOLOGIC: denies gross bleeding, bruising  LYMPHATICS: denies enlarged lymph nodes, extremity swelling  PSYCHIATRIC: denies recent changes in anxiety, depression  ENDOCRINOLOGIC: denies sweating, cold or heat intolerance

## 2023-12-22 NOTE — H&P ADULT - ATTENDING SUPERVISION STATEMENT
I have re-evaluated the patient's fluid status and reviewed vital signs. Clinical perfusion assessment was performed. Resident

## 2023-12-22 NOTE — ED ADULT NURSE NOTE - NSFALLUNIVINTERV_ED_ALL_ED
Bed/Stretcher in lowest position, wheels locked, appropriate side rails in place/Call bell, personal items and telephone in reach/Instruct patient to call for assistance before getting out of bed/chair/stretcher/Non-slip footwear applied when patient is off stretcher/[place] to call system/Physically safe environment - no spills, clutter or unnecessary equipment/Purposeful proactive rounding/Room/bathroom lighting operational, light cord in reach Bed/Stretcher in lowest position, wheels locked, appropriate side rails in place/Call bell, personal items and telephone in reach/Instruct patient to call for assistance before getting out of bed/chair/stretcher/Non-slip footwear applied when patient is off stretcher/[place] to call system/Physically safe environment - no spills, clutter or unnecessary equipment/Purposeful proactive rounding/Room/bathroom lighting operational, light cord in reach

## 2023-12-22 NOTE — H&P ADULT - NSHPPHYSICALEXAM_GEN_ALL_CORE
T(C): 36.3 (12-22-23 @ 11:26), Max: 36.3 (12-22-23 @ 11:26)  HR: 86 (12-22-23 @ 11:26) (86 - 86)  BP: 165/83 (12-22-23 @ 11:26) (165/83 - 165/83)  RR: 16 (12-22-23 @ 11:26) (16 - 16)  SpO2: 97% (12-22-23 @ 11:26) (97% - 97%)    GENERAL: patient appears well, no acute distress, appropriate, pleasant  EYES: sclera clear, no exudates  ENMT: oropharynx clear without erythema, no exudates, moist mucous membranes  NECK: supple, soft, no thyromegaly noted  LUNGS: good air entry bilaterally, clear to auscultation, symmetric breath sounds, no wheezing or rhonchi appreciated  HEART: soft S1/S2, regular rate and rhythm, no murmurs noted, lower extremity edema present  GASTROINTESTINAL: abdomen is soft, nontender, nondistended, normoactive bowel sounds, no palpable masses  INTEGUMENT: good skin turgor, no lesions noted  MUSCULOSKELETAL: no clubbing or cyanosis, no obvious deformity  NEUROLOGIC: awake, alert, oriented x3, good muscle tone in 4 extremities, no obvious sensory deficits  PSYCHIATRIC: mood is good, affect is congruent, linear and logical thought process  HEME/LYMPH: no palpable supraclavicular nodules, no obvious ecchymosis or petechiae

## 2023-12-23 LAB
ALBUMIN SERPL ELPH-MCNC: 3.2 G/DL — LOW (ref 3.3–5)
ALBUMIN SERPL ELPH-MCNC: 3.2 G/DL — LOW (ref 3.3–5)
ALP SERPL-CCNC: 54 U/L — SIGNIFICANT CHANGE UP (ref 40–120)
ALP SERPL-CCNC: 54 U/L — SIGNIFICANT CHANGE UP (ref 40–120)
ALT FLD-CCNC: 24 U/L — SIGNIFICANT CHANGE UP (ref 12–78)
ALT FLD-CCNC: 24 U/L — SIGNIFICANT CHANGE UP (ref 12–78)
ANION GAP SERPL CALC-SCNC: 6 MMOL/L — SIGNIFICANT CHANGE UP (ref 5–17)
ANION GAP SERPL CALC-SCNC: 6 MMOL/L — SIGNIFICANT CHANGE UP (ref 5–17)
AST SERPL-CCNC: 19 U/L — SIGNIFICANT CHANGE UP (ref 15–37)
AST SERPL-CCNC: 19 U/L — SIGNIFICANT CHANGE UP (ref 15–37)
BASOPHILS # BLD AUTO: 0.04 K/UL — SIGNIFICANT CHANGE UP (ref 0–0.2)
BASOPHILS # BLD AUTO: 0.04 K/UL — SIGNIFICANT CHANGE UP (ref 0–0.2)
BASOPHILS NFR BLD AUTO: 0.8 % — SIGNIFICANT CHANGE UP (ref 0–2)
BASOPHILS NFR BLD AUTO: 0.8 % — SIGNIFICANT CHANGE UP (ref 0–2)
BILIRUB SERPL-MCNC: 0.6 MG/DL — SIGNIFICANT CHANGE UP (ref 0.2–1.2)
BILIRUB SERPL-MCNC: 0.6 MG/DL — SIGNIFICANT CHANGE UP (ref 0.2–1.2)
BUN SERPL-MCNC: 15 MG/DL — SIGNIFICANT CHANGE UP (ref 7–23)
BUN SERPL-MCNC: 15 MG/DL — SIGNIFICANT CHANGE UP (ref 7–23)
CALCIUM SERPL-MCNC: 8.7 MG/DL — SIGNIFICANT CHANGE UP (ref 8.5–10.1)
CALCIUM SERPL-MCNC: 8.7 MG/DL — SIGNIFICANT CHANGE UP (ref 8.5–10.1)
CHLORIDE SERPL-SCNC: 110 MMOL/L — HIGH (ref 96–108)
CHLORIDE SERPL-SCNC: 110 MMOL/L — HIGH (ref 96–108)
CO2 SERPL-SCNC: 26 MMOL/L — SIGNIFICANT CHANGE UP (ref 22–31)
CO2 SERPL-SCNC: 26 MMOL/L — SIGNIFICANT CHANGE UP (ref 22–31)
CREAT SERPL-MCNC: 0.61 MG/DL — SIGNIFICANT CHANGE UP (ref 0.5–1.3)
CREAT SERPL-MCNC: 0.61 MG/DL — SIGNIFICANT CHANGE UP (ref 0.5–1.3)
EGFR: 97 ML/MIN/1.73M2 — SIGNIFICANT CHANGE UP
EGFR: 97 ML/MIN/1.73M2 — SIGNIFICANT CHANGE UP
EOSINOPHIL # BLD AUTO: 0.15 K/UL — SIGNIFICANT CHANGE UP (ref 0–0.5)
EOSINOPHIL # BLD AUTO: 0.15 K/UL — SIGNIFICANT CHANGE UP (ref 0–0.5)
EOSINOPHIL NFR BLD AUTO: 2.9 % — SIGNIFICANT CHANGE UP (ref 0–6)
EOSINOPHIL NFR BLD AUTO: 2.9 % — SIGNIFICANT CHANGE UP (ref 0–6)
GLUCOSE SERPL-MCNC: 95 MG/DL — SIGNIFICANT CHANGE UP (ref 70–99)
GLUCOSE SERPL-MCNC: 95 MG/DL — SIGNIFICANT CHANGE UP (ref 70–99)
HCT VFR BLD CALC: 40.1 % — SIGNIFICANT CHANGE UP (ref 34.5–45)
HCT VFR BLD CALC: 40.1 % — SIGNIFICANT CHANGE UP (ref 34.5–45)
HGB BLD-MCNC: 13.7 G/DL — SIGNIFICANT CHANGE UP (ref 11.5–15.5)
HGB BLD-MCNC: 13.7 G/DL — SIGNIFICANT CHANGE UP (ref 11.5–15.5)
IMM GRANULOCYTES NFR BLD AUTO: 0.2 % — SIGNIFICANT CHANGE UP (ref 0–0.9)
IMM GRANULOCYTES NFR BLD AUTO: 0.2 % — SIGNIFICANT CHANGE UP (ref 0–0.9)
LYMPHOCYTES # BLD AUTO: 1.02 K/UL — SIGNIFICANT CHANGE UP (ref 1–3.3)
LYMPHOCYTES # BLD AUTO: 1.02 K/UL — SIGNIFICANT CHANGE UP (ref 1–3.3)
LYMPHOCYTES # BLD AUTO: 19.8 % — SIGNIFICANT CHANGE UP (ref 13–44)
LYMPHOCYTES # BLD AUTO: 19.8 % — SIGNIFICANT CHANGE UP (ref 13–44)
MCHC RBC-ENTMCNC: 31.9 PG — SIGNIFICANT CHANGE UP (ref 27–34)
MCHC RBC-ENTMCNC: 31.9 PG — SIGNIFICANT CHANGE UP (ref 27–34)
MCHC RBC-ENTMCNC: 34.2 GM/DL — SIGNIFICANT CHANGE UP (ref 32–36)
MCHC RBC-ENTMCNC: 34.2 GM/DL — SIGNIFICANT CHANGE UP (ref 32–36)
MCV RBC AUTO: 93.3 FL — SIGNIFICANT CHANGE UP (ref 80–100)
MCV RBC AUTO: 93.3 FL — SIGNIFICANT CHANGE UP (ref 80–100)
MONOCYTES # BLD AUTO: 0.45 K/UL — SIGNIFICANT CHANGE UP (ref 0–0.9)
MONOCYTES # BLD AUTO: 0.45 K/UL — SIGNIFICANT CHANGE UP (ref 0–0.9)
MONOCYTES NFR BLD AUTO: 8.8 % — SIGNIFICANT CHANGE UP (ref 2–14)
MONOCYTES NFR BLD AUTO: 8.8 % — SIGNIFICANT CHANGE UP (ref 2–14)
NEUTROPHILS # BLD AUTO: 3.47 K/UL — SIGNIFICANT CHANGE UP (ref 1.8–7.4)
NEUTROPHILS # BLD AUTO: 3.47 K/UL — SIGNIFICANT CHANGE UP (ref 1.8–7.4)
NEUTROPHILS NFR BLD AUTO: 67.5 % — SIGNIFICANT CHANGE UP (ref 43–77)
NEUTROPHILS NFR BLD AUTO: 67.5 % — SIGNIFICANT CHANGE UP (ref 43–77)
NRBC # BLD: 0 /100 WBCS — SIGNIFICANT CHANGE UP (ref 0–0)
NRBC # BLD: 0 /100 WBCS — SIGNIFICANT CHANGE UP (ref 0–0)
PLATELET # BLD AUTO: 278 K/UL — SIGNIFICANT CHANGE UP (ref 150–400)
PLATELET # BLD AUTO: 278 K/UL — SIGNIFICANT CHANGE UP (ref 150–400)
POTASSIUM SERPL-MCNC: 3.4 MMOL/L — LOW (ref 3.5–5.3)
POTASSIUM SERPL-MCNC: 3.4 MMOL/L — LOW (ref 3.5–5.3)
POTASSIUM SERPL-SCNC: 3.4 MMOL/L — LOW (ref 3.5–5.3)
POTASSIUM SERPL-SCNC: 3.4 MMOL/L — LOW (ref 3.5–5.3)
PROT SERPL-MCNC: 6.5 G/DL — SIGNIFICANT CHANGE UP (ref 6–8.3)
PROT SERPL-MCNC: 6.5 G/DL — SIGNIFICANT CHANGE UP (ref 6–8.3)
RBC # BLD: 4.3 M/UL — SIGNIFICANT CHANGE UP (ref 3.8–5.2)
RBC # BLD: 4.3 M/UL — SIGNIFICANT CHANGE UP (ref 3.8–5.2)
RBC # FLD: 12.6 % — SIGNIFICANT CHANGE UP (ref 10.3–14.5)
RBC # FLD: 12.6 % — SIGNIFICANT CHANGE UP (ref 10.3–14.5)
SODIUM SERPL-SCNC: 142 MMOL/L — SIGNIFICANT CHANGE UP (ref 135–145)
SODIUM SERPL-SCNC: 142 MMOL/L — SIGNIFICANT CHANGE UP (ref 135–145)
WBC # BLD: 5.14 K/UL — SIGNIFICANT CHANGE UP (ref 3.8–10.5)
WBC # BLD: 5.14 K/UL — SIGNIFICANT CHANGE UP (ref 3.8–10.5)
WBC # FLD AUTO: 5.14 K/UL — SIGNIFICANT CHANGE UP (ref 3.8–10.5)
WBC # FLD AUTO: 5.14 K/UL — SIGNIFICANT CHANGE UP (ref 3.8–10.5)

## 2023-12-23 PROCEDURE — 99233 SBSQ HOSP IP/OBS HIGH 50: CPT

## 2023-12-23 RX ORDER — POTASSIUM CHLORIDE 20 MEQ
40 PACKET (EA) ORAL ONCE
Refills: 0 | Status: COMPLETED | OUTPATIENT
Start: 2023-12-23 | End: 2023-12-23

## 2023-12-23 RX ORDER — INFLUENZA VIRUS VACCINE 15; 15; 15; 15 UG/.5ML; UG/.5ML; UG/.5ML; UG/.5ML
0.7 SUSPENSION INTRAMUSCULAR ONCE
Refills: 0 | Status: DISCONTINUED | OUTPATIENT
Start: 2023-12-23 | End: 2023-12-26

## 2023-12-23 RX ADMIN — LOSARTAN POTASSIUM 50 MILLIGRAM(S): 100 TABLET, FILM COATED ORAL at 06:12

## 2023-12-23 RX ADMIN — ERTAPENEM SODIUM 120 MILLIGRAM(S): 1 INJECTION, POWDER, LYOPHILIZED, FOR SOLUTION INTRAMUSCULAR; INTRAVENOUS at 17:18

## 2023-12-23 RX ADMIN — Medication 1 TABLET(S): at 15:29

## 2023-12-23 RX ADMIN — ENOXAPARIN SODIUM 40 MILLIGRAM(S): 100 INJECTION SUBCUTANEOUS at 06:12

## 2023-12-23 RX ADMIN — Medication 125 MILLIGRAM(S): at 15:29

## 2023-12-23 RX ADMIN — Medication 40 MILLIEQUIVALENT(S): at 12:11

## 2023-12-23 RX ADMIN — SODIUM CHLORIDE 75 MILLILITER(S): 9 INJECTION INTRAMUSCULAR; INTRAVENOUS; SUBCUTANEOUS at 12:10

## 2023-12-23 NOTE — PATIENT PROFILE ADULT - DO YOU FEEL LIKE HURTING YOURSELF OR OTHERS?
Constitutional: No fever, chills.  Eyes: No visual changes.  ENT: No hearing changes.  Neck: No neck pain or stiffness.  Cardiovascular: No chest pain, palpitations, edema.  Pulmonary: No SOB, cough. No hemoptysis.  Abdominal: see hpi  : No dysuria, frequency.  Neuro: No headache, syncope, dizziness.  MS: No back pain. No calf pain/swelling.  Psych: No suicidal ideations. no

## 2023-12-23 NOTE — PROGRESS NOTE ADULT - SUBJECTIVE AND OBJECTIVE BOX
Patient is a 68y old  Female who presents with a chief complaint of UTI (22 Dec 2023 16:31)       INTERVAL HPI/OVERNIGHT EVENTS: patient seen and examined at bedside. Denies any new symptoms, complaints. Denies chest pain, palpitations, sob     MEDICATIONS  (STANDING):  enoxaparin Injectable 40 milliGRAM(s) SubCutaneous every 24 hours  ertapenem  IVPB 1000 milliGRAM(s) IV Intermittent every 24 hours  hydrochlorothiazide 25 milliGRAM(s) Oral daily  influenza  Vaccine (HIGH DOSE) 0.7 milliLiter(s) IntraMuscular once  lactobacillus acidophilus 1 Tablet(s) Oral daily  losartan 50 milliGRAM(s) Oral daily  potassium chloride    Tablet ER 40 milliEquivalent(s) Oral once  sodium chloride 0.9%. 1000 milliLiter(s) (75 mL/Hr) IV Continuous <Continuous>  vancomycin Capsule. 125 milliGRAM(s) Oral daily    MEDICATIONS  (PRN):  acetaminophen     Tablet .. 650 milliGRAM(s) Oral every 6 hours PRN Temp greater or equal to 38C (100.4F), Mild Pain (1 - 3)  aluminum hydroxide/magnesium hydroxide/simethicone Suspension 30 milliLiter(s) Oral every 4 hours PRN Dyspepsia  melatonin 3 milliGRAM(s) Oral at bedtime PRN Insomnia  ondansetron Injectable 4 milliGRAM(s) IV Push every 8 hours PRN Nausea and/or Vomiting      Allergies    No Known Drug Allergies  tape (Rash)    Intolerances        REVIEW OF SYSTEMS:  CONSTITUTIONAL: No fever or fatigue  EYES: No eye pain, visual disturbances, or discharge  ENMT:  No difficulty hearing, tinnitus, vertigo; No sinus or throat pain  NECK: No pain or stiffness  RESPIRATORY: No cough, wheezing, chills or hemoptysis; No shortness of breath  CARDIOVASCULAR: No chest pain, palpitations, dizziness, or leg swelling  GASTROINTESTINAL: No abdominal or epigastric pain. No nausea, vomiting, or hematemesis; No diarrhea or constipation.   GENITOURINARY: No dysuria, frequency, hematuria, or incontinence  NEUROLOGICAL: No headaches, memory loss, loss of strength, numbness, or tremors  SKIN: No itching, burning, rashes, or lesions   LYMPH NODES: No enlarged glands  ENDOCRINE: No heat or cold intolerance; No hair loss; No polydipsia or polyuria  MUSCULOSKELETAL: No joint pain or swelling; No muscle, back, or extremity pain  PSYCHIATRIC: No depression, anxiety, mood swings, or difficulty sleeping  HEME/LYMPH: No easy bruising, or bleeding gums  ALLERGY AND IMMUNOLOGIC: No hives or eczema    Vital Signs Last 24 Hrs  T(C): 36.5 (23 Dec 2023 05:14), Max: 36.6 (22 Dec 2023 21:04)  T(F): 97.7 (23 Dec 2023 05:14), Max: 97.8 (22 Dec 2023 21:04)  HR: 78 (23 Dec 2023 05:14) (66 - 86)  BP: 110/57 (23 Dec 2023 05:14) (110/57 - 165/83)  BP(mean): --  RR: 18 (23 Dec 2023 05:14) (16 - 18)  SpO2: 99% (23 Dec 2023 05:14) (97% - 99%)    Parameters below as of 23 Dec 2023 05:14  Patient On (Oxygen Delivery Method): room air        PHYSICAL EXAM:  GENERAL: NAD, well-groomed, well-developed  HEAD:  Atraumatic, Normocephalic  EYES: EOMI, PERRLA, conjunctiva and sclera clear  ENMT: No tonsillar erythema, exudates, or enlargement; Moist mucous membranes, Good dentition, No lesions  NECK: Supple, No JVD, Normal thyroid  NERVOUS SYSTEM:  Alert & Oriented X3, Good concentration; Motor Strength 5/5 B/L upper and lower extremities  CHEST/LUNG: Clear to auscultation bilaterally; No rales, rhonchi, wheezing, or rubs  HEART: Regular rate and rhythm; No murmurs, rubs, or gallops  ABDOMEN: Soft, Nontender, Nondistended; Bowel sounds present  EXTREMITIES:  2+ Peripheral Pulses, No clubbing, cyanosis, or edema  LYMPH: No lymphadenopathy noted  SKIN: No rashes or lesions    LABS:                        13.7   5.14  )-----------( 278      ( 23 Dec 2023 06:42 )             40.1     23 Dec 2023 06:42    142    |  110    |  15     ----------------------------<  95     3.4     |  26     |  0.61     Ca    8.7        23 Dec 2023 06:42    TPro  6.5    /  Alb  3.2    /  TBili  0.6    /  DBili  x      /  AST  19     /  ALT  24     /  AlkPhos  54     23 Dec 2023 06:42      CAPILLARY BLOOD GLUCOSE        BLOOD CULTURE    RADIOLOGY & ADDITIONAL TESTS:    Imaging Personally Reviewed:  [ ] YES     Consultant(s) Notes Reviewed:      Care Discussed with Consultants/Other Providers:

## 2023-12-23 NOTE — PATIENT PROFILE ADULT - FALL HARM RISK - RISK INTERVENTIONS
Communicate Fall Risk and Risk Factors to all staff, patient, and family/Reinforce activity limits and safety measures with patient and family/Visual Cue: Yellow wristband/Bed in lowest position, wheels locked, appropriate side rails in place/Call bell, personal items and telephone in reach/Instruct patient to call for assistance before getting out of bed or chair/Non-slip footwear when patient is out of bed/[place] to call system/Physically safe environment - no spills, clutter or unnecessary equipment/Purposeful Proactive Rounding/Room/bathroom lighting operational, light cord in reach Communicate Fall Risk and Risk Factors to all staff, patient, and family/Reinforce activity limits and safety measures with patient and family/Visual Cue: Yellow wristband/Bed in lowest position, wheels locked, appropriate side rails in place/Call bell, personal items and telephone in reach/Instruct patient to call for assistance before getting out of bed or chair/Non-slip footwear when patient is out of bed/[place] to call system/Physically safe environment - no spills, clutter or unnecessary equipment/Purposeful Proactive Rounding/Room/bathroom lighting operational, light cord in reach

## 2023-12-24 LAB
ANION GAP SERPL CALC-SCNC: 3 MMOL/L — LOW (ref 5–17)
ANION GAP SERPL CALC-SCNC: 3 MMOL/L — LOW (ref 5–17)
BUN SERPL-MCNC: 12 MG/DL — SIGNIFICANT CHANGE UP (ref 7–23)
BUN SERPL-MCNC: 12 MG/DL — SIGNIFICANT CHANGE UP (ref 7–23)
CALCIUM SERPL-MCNC: 9 MG/DL — SIGNIFICANT CHANGE UP (ref 8.5–10.1)
CALCIUM SERPL-MCNC: 9 MG/DL — SIGNIFICANT CHANGE UP (ref 8.5–10.1)
CHLORIDE SERPL-SCNC: 109 MMOL/L — HIGH (ref 96–108)
CHLORIDE SERPL-SCNC: 109 MMOL/L — HIGH (ref 96–108)
CO2 SERPL-SCNC: 30 MMOL/L — SIGNIFICANT CHANGE UP (ref 22–31)
CO2 SERPL-SCNC: 30 MMOL/L — SIGNIFICANT CHANGE UP (ref 22–31)
CREAT SERPL-MCNC: 0.64 MG/DL — SIGNIFICANT CHANGE UP (ref 0.5–1.3)
CREAT SERPL-MCNC: 0.64 MG/DL — SIGNIFICANT CHANGE UP (ref 0.5–1.3)
EGFR: 96 ML/MIN/1.73M2 — SIGNIFICANT CHANGE UP
EGFR: 96 ML/MIN/1.73M2 — SIGNIFICANT CHANGE UP
GLUCOSE SERPL-MCNC: 89 MG/DL — SIGNIFICANT CHANGE UP (ref 70–99)
GLUCOSE SERPL-MCNC: 89 MG/DL — SIGNIFICANT CHANGE UP (ref 70–99)
HCT VFR BLD CALC: 41.7 % — SIGNIFICANT CHANGE UP (ref 34.5–45)
HCT VFR BLD CALC: 41.7 % — SIGNIFICANT CHANGE UP (ref 34.5–45)
HGB BLD-MCNC: 14.1 G/DL — SIGNIFICANT CHANGE UP (ref 11.5–15.5)
HGB BLD-MCNC: 14.1 G/DL — SIGNIFICANT CHANGE UP (ref 11.5–15.5)
MCHC RBC-ENTMCNC: 31.8 PG — SIGNIFICANT CHANGE UP (ref 27–34)
MCHC RBC-ENTMCNC: 31.8 PG — SIGNIFICANT CHANGE UP (ref 27–34)
MCHC RBC-ENTMCNC: 33.8 GM/DL — SIGNIFICANT CHANGE UP (ref 32–36)
MCHC RBC-ENTMCNC: 33.8 GM/DL — SIGNIFICANT CHANGE UP (ref 32–36)
MCV RBC AUTO: 94.1 FL — SIGNIFICANT CHANGE UP (ref 80–100)
MCV RBC AUTO: 94.1 FL — SIGNIFICANT CHANGE UP (ref 80–100)
NRBC # BLD: 0 /100 WBCS — SIGNIFICANT CHANGE UP (ref 0–0)
NRBC # BLD: 0 /100 WBCS — SIGNIFICANT CHANGE UP (ref 0–0)
PLATELET # BLD AUTO: 295 K/UL — SIGNIFICANT CHANGE UP (ref 150–400)
PLATELET # BLD AUTO: 295 K/UL — SIGNIFICANT CHANGE UP (ref 150–400)
POTASSIUM SERPL-MCNC: 3.8 MMOL/L — SIGNIFICANT CHANGE UP (ref 3.5–5.3)
POTASSIUM SERPL-MCNC: 3.8 MMOL/L — SIGNIFICANT CHANGE UP (ref 3.5–5.3)
POTASSIUM SERPL-SCNC: 3.8 MMOL/L — SIGNIFICANT CHANGE UP (ref 3.5–5.3)
POTASSIUM SERPL-SCNC: 3.8 MMOL/L — SIGNIFICANT CHANGE UP (ref 3.5–5.3)
RBC # BLD: 4.43 M/UL — SIGNIFICANT CHANGE UP (ref 3.8–5.2)
RBC # BLD: 4.43 M/UL — SIGNIFICANT CHANGE UP (ref 3.8–5.2)
RBC # FLD: 12.5 % — SIGNIFICANT CHANGE UP (ref 10.3–14.5)
RBC # FLD: 12.5 % — SIGNIFICANT CHANGE UP (ref 10.3–14.5)
SODIUM SERPL-SCNC: 142 MMOL/L — SIGNIFICANT CHANGE UP (ref 135–145)
SODIUM SERPL-SCNC: 142 MMOL/L — SIGNIFICANT CHANGE UP (ref 135–145)
WBC # BLD: 4.57 K/UL — SIGNIFICANT CHANGE UP (ref 3.8–10.5)
WBC # BLD: 4.57 K/UL — SIGNIFICANT CHANGE UP (ref 3.8–10.5)
WBC # FLD AUTO: 4.57 K/UL — SIGNIFICANT CHANGE UP (ref 3.8–10.5)
WBC # FLD AUTO: 4.57 K/UL — SIGNIFICANT CHANGE UP (ref 3.8–10.5)

## 2023-12-24 PROCEDURE — 99233 SBSQ HOSP IP/OBS HIGH 50: CPT

## 2023-12-24 PROCEDURE — 99232 SBSQ HOSP IP/OBS MODERATE 35: CPT

## 2023-12-24 RX ADMIN — Medication 30 MILLILITER(S): at 10:31

## 2023-12-24 RX ADMIN — ERTAPENEM SODIUM 120 MILLIGRAM(S): 1 INJECTION, POWDER, LYOPHILIZED, FOR SOLUTION INTRAMUSCULAR; INTRAVENOUS at 17:11

## 2023-12-24 RX ADMIN — LOSARTAN POTASSIUM 50 MILLIGRAM(S): 100 TABLET, FILM COATED ORAL at 06:04

## 2023-12-24 RX ADMIN — Medication 125 MILLIGRAM(S): at 11:21

## 2023-12-24 RX ADMIN — Medication 1 TABLET(S): at 11:21

## 2023-12-24 NOTE — PROGRESS NOTE ADULT - SUBJECTIVE AND OBJECTIVE BOX
[facility]  INFECTIOUS DISEASES - [address], [place] [address]  Tel: [phone]     Fax: [phone]  =======================================================    TANIA VICTOR [id number]    Follow up: No fevers. Denies any loose stools yesterday, but today had "explosive" bowel movement. Denies any dysuria.    Allergies:  No Known Drug Allergies  tape (Rash)      Antibiotics:  acetaminophen     Tablet .. 650 milliGRAM(s) Oral every 6 hours PRN  aluminum hydroxide/magnesium hydroxide/simethicone Suspension 30 milliLiter(s) Oral every 4 hours PRN  enoxaparin Injectable 40 milliGRAM(s) SubCutaneous every 24 hours  ertapenem  IVPB 1000 milliGRAM(s) IV Intermittent every 24 hours  hydrochlorothiazide 25 milliGRAM(s) Oral daily  influenza  Vaccine (HIGH DOSE) 0.7 milliLiter(s) IntraMuscular once  lactobacillus acidophilus 1 Tablet(s) Oral daily  losartan 50 milliGRAM(s) Oral daily  melatonin 3 milliGRAM(s) Oral at bedtime PRN  ondansetron Injectable 4 milliGRAM(s) IV Push every 8 hours PRN  vancomycin Capsule. 125 milliGRAM(s) Oral daily       REVIEW OF SYSTEMS:  CONSTITUTIONAL:  No Fever or chills  HEENT:  No sore throat or runny nose.  CARDIOVASCULAR:  No chest pain or SOB.  RESPIRATORY:  No cough, shortness of breath  GASTROINTESTINAL:  No nausea, vomiting or diarrhea.  GENITOURINARY:  see history  MUSCULOSKELETAL:  no joint aches, no muscle pain  NEUROLOGIC:  No headache or dizziness  PSYCHIATRIC:  No disorder of thought or mood.     Physical Exam:  ICU Vital Signs Last 24 Hrs  T(C): 36.4 (24 Dec 2023 06:01), Max: 36.7 (23 Dec 2023 21:22)  T(F): 97.6 (24 Dec 2023 06:01), Max: 98.1 (23 Dec 2023 21:22)  HR: 71 (24 Dec 2023 06:01) (71 - 74)  BP: 134/84 (24 Dec 2023 06:01) (102/66 - 134/84)  BP(mean): --  ABP: --  ABP(mean): --  RR: 16 (24 Dec 2023 06:01) (16 - 18)  SpO2: 94% (24 Dec 2023 06:01) (93% - 94%)    O2 Parameters below as of 24 Dec 2023 06:01  Patient On (Oxygen Delivery Method): room air       GEN: NAD  HEENT: normocephalic and atraumatic.   NECK: Supple.   LUNGS: Normal respiratory effort  HEART: Regular rate and rhythm   ABDOMEN: Soft, nontender, and nondistended.    EXTREMITIES: No leg edema.  NEUROLOGIC: grossly intact.  PSYCHIATRIC: Appropriate affect .    Labs:  12-24    142  |  109<H>  |  12  ----------------------------<  89  3.8   |  30  |  0.64    Ca    9.0      24 Dec 2023 06:35    TPro  6.5  /  Alb  3.2<L>  /  TBili  0.6  /  DBili  x   /  AST  19  /  ALT  24  /  AlkPhos  54  12-23                          14.1   4.57  )-----------( 295      ( 24 Dec 2023 06:35 )             41.7       Urinalysis Basic - ( 24 Dec 2023 06:35 )    Color: x / Appearance: x / SG: x / pH: x  Gluc: 89 mg/dL / Ketone: x  / Bili: x / Urobili: x   Blood: x / Protein: x / Nitrite: x   Leuk Esterase: x / RBC: x / WBC x   Sq Epi: x / Non Sq Epi: x / Bacteria: x      LIVER FUNCTIONS - ( 23 Dec 2023 06:42 )  Alb: 3.2 g/dL / Pro: 6.5 g/dL / ALK PHOS: 54 U/L / ALT: 24 U/L / AST: 19 U/L / GGT: x             RECENT CULTURES:  12-22 @ 12:45 .Blood Blood-Peripheral     No growth at 24 hours        12-22 @ 12:40 .Blood Blood-Peripheral     No growth at 24 hours              All imaging and data are reviewed.      [facility]  INFECTIOUS DISEASES - [address], [place] [address]  Tel: [phone]     Fax: [phone]  =======================================================    TANIA VICTOR [id number]    Follow up: No fevers. Denies any loose stools yesterday, but today had "explosive" bowel movement. Denies any dysuria.    Allergies:  No Known Drug Allergies  tape (Rash)      Antibiotics:  acetaminophen     Tablet .. 650 milliGRAM(s) Oral every 6 hours PRN  aluminum hydroxide/magnesium hydroxide/simethicone Suspension 30 milliLiter(s) Oral every 4 hours PRN  enoxaparin Injectable 40 milliGRAM(s) SubCutaneous every 24 hours  ertapenem  IVPB 1000 milliGRAM(s) IV Intermittent every 24 hours  hydrochlorothiazide 25 milliGRAM(s) Oral daily  influenza  Vaccine (HIGH DOSE) 0.7 milliLiter(s) IntraMuscular once  lactobacillus acidophilus 1 Tablet(s) Oral daily  losartan 50 milliGRAM(s) Oral daily  melatonin 3 milliGRAM(s) Oral at bedtime PRN  ondansetron Injectable 4 milliGRAM(s) IV Push every 8 hours PRN  vancomycin Capsule. 125 milliGRAM(s) Oral daily       REVIEW OF SYSTEMS:  CONSTITUTIONAL:  No Fever or chills  HEENT:  No sore throat or runny nose.  CARDIOVASCULAR:  No chest pain or SOB.  RESPIRATORY:  No cough, shortness of breath  GASTROINTESTINAL:  No nausea, vomiting or diarrhea.  GENITOURINARY:  see history  MUSCULOSKELETAL:  no joint aches, no muscle pain  NEUROLOGIC:  No headache or dizziness  PSYCHIATRIC:  No disorder of thought or mood.     Physical Exam:  ICU Vital Signs Last 24 Hrs  T(C): 36.4 (24 Dec 2023 06:01), Max: 36.7 (23 Dec 2023 21:22)  T(F): 97.6 (24 Dec 2023 06:01), Max: 98.1 (23 Dec 2023 21:22)  HR: 71 (24 Dec 2023 06:01) (71 - 74)  BP: 134/84 (24 Dec 2023 06:01) (102/66 - 134/84)  BP(mean): --  ABP: --  ABP(mean): --  RR: 16 (24 Dec 2023 06:01) (16 - 18)  SpO2: 94% (24 Dec 2023 06:01) (93% - 94%)    O2 Parameters below as of 24 Dec 2023 06:01  Patient On (Oxygen Delivery Method): room air       GEN: NAD  HEENT: normocephalic and atraumatic.   NECK: Supple.   LUNGS: Normal respiratory effort  HEART: Regular rate and rhythm   ABDOMEN: Soft, nontender, and nondistended.    EXTREMITIES: No leg edema.  NEUROLOGIC: grossly intact.  PSYCHIATRIC: Appropriate affect .    Labs:  12-24    142  |  109<H>  |  12  ----------------------------<  89  3.8   |  30  |  0.64    Ca    9.0      24 Dec 2023 06:35    TPro  6.5  /  Alb  3.2<L>  /  TBili  0.6  /  DBili  x   /  AST  19  /  ALT  24  /  AlkPhos  54  12-23                          14.1   4.57  )-----------( 295      ( 24 Dec 2023 06:35 )             41.7       Urinalysis Basic - ( 24 Dec 2023 06:35 )    Color: x / Appearance: x / SG: x / pH: x  Gluc: 89 mg/dL / Ketone: x  / Bili: x / Urobili: x   Blood: x / Protein: x / Nitrite: x   Leuk Esterase: x / RBC: x / WBC x   Sq Epi: x / Non Sq Epi: x / Bacteria: x      LIVER FUNCTIONS - ( 23 Dec 2023 06:42 )  Alb: 3.2 g/dL / Pro: 6.5 g/dL / ALK PHOS: 54 U/L / ALT: 24 U/L / AST: 19 U/L / GGT: x             RECENT CULTURES:  12-22 @ 12:45 .Blood Blood-Peripheral     No growth at 24 hours        12-22 @ 12:40 .Blood Blood-Peripheral     No growth at 24 hours              All imaging and data are reviewed.

## 2023-12-24 NOTE — PROGRESS NOTE ADULT - SUBJECTIVE AND OBJECTIVE BOX
Patient is a 68y old  Female who presents with a chief complaint of UTI (23 Dec 2023 08:41)       INTERVAL HPI/OVERNIGHT EVENTS: Patient seen and examined at bedside. C/o diarrhea this morning. Feels frustrated that urine culture still not back. Counseling and education provided.     MEDICATIONS  (STANDING):  enoxaparin Injectable 40 milliGRAM(s) SubCutaneous every 24 hours  ertapenem  IVPB 1000 milliGRAM(s) IV Intermittent every 24 hours  hydrochlorothiazide 25 milliGRAM(s) Oral daily  influenza  Vaccine (HIGH DOSE) 0.7 milliLiter(s) IntraMuscular once  lactobacillus acidophilus 1 Tablet(s) Oral daily  losartan 50 milliGRAM(s) Oral daily  vancomycin Capsule. 125 milliGRAM(s) Oral daily    MEDICATIONS  (PRN):  acetaminophen     Tablet .. 650 milliGRAM(s) Oral every 6 hours PRN Temp greater or equal to 38C (100.4F), Mild Pain (1 - 3)  aluminum hydroxide/magnesium hydroxide/simethicone Suspension 30 milliLiter(s) Oral every 4 hours PRN Dyspepsia  melatonin 3 milliGRAM(s) Oral at bedtime PRN Insomnia  ondansetron Injectable 4 milliGRAM(s) IV Push every 8 hours PRN Nausea and/or Vomiting      Allergies    No Known Drug Allergies  tape (Rash)    Intolerances        REVIEW OF SYSTEMS:  CONSTITUTIONAL: No fever or fatigue  EYES: No eye pain, visual disturbances, or discharge  ENMT:  No difficulty hearing, tinnitus, vertigo; No sinus or throat pain  NECK: No pain or stiffness  RESPIRATORY: No cough, wheezing, chills or hemoptysis; No shortness of breath  CARDIOVASCULAR: No chest pain, palpitations, dizziness, or leg swelling  GASTROINTESTINAL: No abdominal or epigastric pain. No nausea, vomiting, or hematemesis, + loose Bm x 1  GENITOURINARY: No dysuria, frequency, hematuria, or incontinence  NEUROLOGICAL: No headaches, memory loss, loss of strength, numbness, or tremors  SKIN: No itching, burning, rashes, or lesions     Vital Signs Last 24 Hrs  T(C): 36.4 (24 Dec 2023 06:01), Max: 36.7 (23 Dec 2023 21:22)  T(F): 97.6 (24 Dec 2023 06:01), Max: 98.1 (23 Dec 2023 21:22)  HR: 71 (24 Dec 2023 06:01) (71 - 76)  BP: 134/84 (24 Dec 2023 06:01) (102/66 - 134/84)  BP(mean): --  RR: 16 (24 Dec 2023 06:01) (16 - 18)  SpO2: 94% (24 Dec 2023 06:01) (93% - 94%)    Parameters below as of 24 Dec 2023 06:01  Patient On (Oxygen Delivery Method): room air        PHYSICAL EXAM:  GENERAL: NAD, well-groomed, well-developed  HEAD:  Atraumatic, Normocephalic  EYES: EOMI, PERRLA, conjunctiva and sclera clear  ENMT: No tonsillar erythema, exudates, or enlargement; Moist mucous membranes  NECK: Supple, No JVD, Normal thyroid  NERVOUS SYSTEM:  Alert & Oriented X3, Good concentration; Motor Strength 5/5 B/L upper and lower extremities  CHEST/LUNG: Clear to auscultation bilaterally; No wheezing   HEART: S1S2+,  Regular rate and rhythm  ABDOMEN: Soft, Nontender, Nondistended; Bowel sounds present  EXTREMITIES:  2+ Peripheral Pulses, No clubbing, cyanosis  LYMPH: No lymphadenopathy noted  SKIN: No rashes or lesions    LABS:                        14.1   4.57  )-----------( 295      ( 24 Dec 2023 06:35 )             41.7     24 Dec 2023 06:35    142    |  109    |  12     ----------------------------<  89     3.8     |  30     |  0.64     Ca    9.0        24 Dec 2023 06:35        CAPILLARY BLOOD GLUCOSE        BLOOD CULTURE  12-22 @ 12:45   No growth at 24 hours  --  --  12-22 @ 12:40   No growth at 24 hours  --  --    RADIOLOGY & ADDITIONAL TESTS:    Imaging Personally Reviewed:  [ ] YES     Consultant(s) Notes Reviewed:      Care Discussed with Consultants/Other Providers:

## 2023-12-24 NOTE — PROGRESS NOTE ADULT - ASSESSMENT
68-year-old female with hx of UTI's, c diff infection (9/2023), who presented with dysuria and lower abdominal pain. Reports LUTS for the past 3 days. Had recent urine cultures collected by urologist which grew ESBL Klebsiella.     Suspect this is uncomplicated cystitis. Will treat with 5 days of antibiotics, would not treat with a longer course especially given recent c diff. No fevers and no leukocytosis. Blood cultures currently no growth. Urine culture growing gram negative rods, likely ESBL klebsiella. CT showed no renal stones or hydronephrosis.         #ESBL klebsiella UTI  #Hx of c diff infection    -continue ertapenem 1g IV q24h until 12/16 to complete 5 days  -continue vancomycin 125mg PO daily for c diff ppx  -follow cultures to completion  -send stool for GI PCR and c diff  -suggest GYN evaluation as outpatient for possible vaginal atrophy--could be contributing to some of her urinary symptoms  -discussed with Dr. Silvia Godinez MD  Division of Infectious Diseases   Cell [phone] between 8am and 6pm   After 6pm and weekends please call ID service at [phone].     35 minutes spent on total encounter assessing patient, examination, chart review, counseling and coordinating care by the attending physician/nurse/care manager.      68-year-old female with hx of UTI's, c diff infection (9/2023), who presented with dysuria and lower abdominal pain. Reports LUTS for the past 3 days. Had recent urine cultures collected by urologist which grew ESBL Klebsiella.     Suspect this is uncomplicated cystitis. Will treat with 5 days of antibiotics, would not treat with a longer course especially given recent c diff. No fevers and no leukocytosis. Blood cultures currently no growth. Urine culture growing gram negative rods, likely ESBL klebsiella. CT showed no renal stones or hydronephrosis.         #ESBL klebsiella UTI  #Hx of c diff infection    -continue ertapenem 1g IV q24h until 12/16 to complete 5 days  -continue vancomycin 125mg PO daily for c diff ppx  -follow cultures to completion  -send stool for GI PCR and c diff  -suggest GYN evaluation as outpatient for possible vaginal atrophy--could be contributing to some of her urinary symptoms  -discussed with Dr. Silvia Godinez MD  Division of Infectious Diseases   Cell [phone] between 8am and 6pm   After 6pm and weekends please call ID service at [phone].     35 minutes spent on total encounter assessing patient, examination, chart review, counseling and coordinating care by the attending physician/nurse/care manager.

## 2023-12-25 LAB
ANION GAP SERPL CALC-SCNC: 3 MMOL/L — LOW (ref 5–17)
ANION GAP SERPL CALC-SCNC: 3 MMOL/L — LOW (ref 5–17)
BUN SERPL-MCNC: 13 MG/DL — SIGNIFICANT CHANGE UP (ref 7–23)
BUN SERPL-MCNC: 13 MG/DL — SIGNIFICANT CHANGE UP (ref 7–23)
C DIFF BY PCR RESULT: SIGNIFICANT CHANGE UP
C DIFF BY PCR RESULT: SIGNIFICANT CHANGE UP
CALCIUM SERPL-MCNC: 9.3 MG/DL — SIGNIFICANT CHANGE UP (ref 8.5–10.1)
CALCIUM SERPL-MCNC: 9.3 MG/DL — SIGNIFICANT CHANGE UP (ref 8.5–10.1)
CHLORIDE SERPL-SCNC: 106 MMOL/L — SIGNIFICANT CHANGE UP (ref 96–108)
CHLORIDE SERPL-SCNC: 106 MMOL/L — SIGNIFICANT CHANGE UP (ref 96–108)
CO2 SERPL-SCNC: 32 MMOL/L — HIGH (ref 22–31)
CO2 SERPL-SCNC: 32 MMOL/L — HIGH (ref 22–31)
CREAT SERPL-MCNC: 0.65 MG/DL — SIGNIFICANT CHANGE UP (ref 0.5–1.3)
CREAT SERPL-MCNC: 0.65 MG/DL — SIGNIFICANT CHANGE UP (ref 0.5–1.3)
EGFR: 96 ML/MIN/1.73M2 — SIGNIFICANT CHANGE UP
EGFR: 96 ML/MIN/1.73M2 — SIGNIFICANT CHANGE UP
GI PCR PANEL: SIGNIFICANT CHANGE UP
GI PCR PANEL: SIGNIFICANT CHANGE UP
GLUCOSE SERPL-MCNC: 89 MG/DL — SIGNIFICANT CHANGE UP (ref 70–99)
GLUCOSE SERPL-MCNC: 89 MG/DL — SIGNIFICANT CHANGE UP (ref 70–99)
HCT VFR BLD CALC: 44.1 % — SIGNIFICANT CHANGE UP (ref 34.5–45)
HCT VFR BLD CALC: 44.1 % — SIGNIFICANT CHANGE UP (ref 34.5–45)
HGB BLD-MCNC: 14.9 G/DL — SIGNIFICANT CHANGE UP (ref 11.5–15.5)
HGB BLD-MCNC: 14.9 G/DL — SIGNIFICANT CHANGE UP (ref 11.5–15.5)
MCHC RBC-ENTMCNC: 31.7 PG — SIGNIFICANT CHANGE UP (ref 27–34)
MCHC RBC-ENTMCNC: 31.7 PG — SIGNIFICANT CHANGE UP (ref 27–34)
MCHC RBC-ENTMCNC: 33.8 GM/DL — SIGNIFICANT CHANGE UP (ref 32–36)
MCHC RBC-ENTMCNC: 33.8 GM/DL — SIGNIFICANT CHANGE UP (ref 32–36)
MCV RBC AUTO: 93.8 FL — SIGNIFICANT CHANGE UP (ref 80–100)
MCV RBC AUTO: 93.8 FL — SIGNIFICANT CHANGE UP (ref 80–100)
NRBC # BLD: 0 /100 WBCS — SIGNIFICANT CHANGE UP (ref 0–0)
NRBC # BLD: 0 /100 WBCS — SIGNIFICANT CHANGE UP (ref 0–0)
PLATELET # BLD AUTO: 310 K/UL — SIGNIFICANT CHANGE UP (ref 150–400)
PLATELET # BLD AUTO: 310 K/UL — SIGNIFICANT CHANGE UP (ref 150–400)
POTASSIUM SERPL-MCNC: 3.8 MMOL/L — SIGNIFICANT CHANGE UP (ref 3.5–5.3)
POTASSIUM SERPL-MCNC: 3.8 MMOL/L — SIGNIFICANT CHANGE UP (ref 3.5–5.3)
POTASSIUM SERPL-SCNC: 3.8 MMOL/L — SIGNIFICANT CHANGE UP (ref 3.5–5.3)
POTASSIUM SERPL-SCNC: 3.8 MMOL/L — SIGNIFICANT CHANGE UP (ref 3.5–5.3)
RBC # BLD: 4.7 M/UL — SIGNIFICANT CHANGE UP (ref 3.8–5.2)
RBC # BLD: 4.7 M/UL — SIGNIFICANT CHANGE UP (ref 3.8–5.2)
RBC # FLD: 12.4 % — SIGNIFICANT CHANGE UP (ref 10.3–14.5)
RBC # FLD: 12.4 % — SIGNIFICANT CHANGE UP (ref 10.3–14.5)
SODIUM SERPL-SCNC: 141 MMOL/L — SIGNIFICANT CHANGE UP (ref 135–145)
SODIUM SERPL-SCNC: 141 MMOL/L — SIGNIFICANT CHANGE UP (ref 135–145)
WBC # BLD: 5.18 K/UL — SIGNIFICANT CHANGE UP (ref 3.8–10.5)
WBC # BLD: 5.18 K/UL — SIGNIFICANT CHANGE UP (ref 3.8–10.5)
WBC # FLD AUTO: 5.18 K/UL — SIGNIFICANT CHANGE UP (ref 3.8–10.5)
WBC # FLD AUTO: 5.18 K/UL — SIGNIFICANT CHANGE UP (ref 3.8–10.5)

## 2023-12-25 PROCEDURE — 99233 SBSQ HOSP IP/OBS HIGH 50: CPT

## 2023-12-25 RX ADMIN — Medication 30 MILLILITER(S): at 01:07

## 2023-12-25 RX ADMIN — ERTAPENEM SODIUM 120 MILLIGRAM(S): 1 INJECTION, POWDER, LYOPHILIZED, FOR SOLUTION INTRAMUSCULAR; INTRAVENOUS at 16:08

## 2023-12-25 RX ADMIN — Medication 30 MILLILITER(S): at 20:02

## 2023-12-25 RX ADMIN — Medication 125 MILLIGRAM(S): at 12:37

## 2023-12-25 RX ADMIN — Medication 1 TABLET(S): at 12:37

## 2023-12-25 RX ADMIN — LOSARTAN POTASSIUM 50 MILLIGRAM(S): 100 TABLET, FILM COATED ORAL at 05:43

## 2023-12-25 NOTE — PROGRESS NOTE ADULT - PROBLEM SELECTOR PLAN 2
Chronic, stable  - c/w home ibersartan w/ theurapeutic conversion  - c/w home HCTZ

## 2023-12-25 NOTE — PROGRESS NOTE ADULT - SUBJECTIVE AND OBJECTIVE BOX
Patient is a 68y old  Female who presents with a chief complaint of UTI (24 Dec 2023 13:59)       INTERVAL HPI/OVERNIGHT EVENTS: Patient seen and examined at bedside. Feeling  better. Had diarrhea this am. Denies chest pain, sob     MEDICATIONS  (STANDING):  enoxaparin Injectable 40 milliGRAM(s) SubCutaneous every 24 hours  ertapenem  IVPB 1000 milliGRAM(s) IV Intermittent every 24 hours  hydrochlorothiazide 25 milliGRAM(s) Oral daily  influenza  Vaccine (HIGH DOSE) 0.7 milliLiter(s) IntraMuscular once  lactobacillus acidophilus 1 Tablet(s) Oral daily  losartan 50 milliGRAM(s) Oral daily  vancomycin Capsule. 125 milliGRAM(s) Oral daily    MEDICATIONS  (PRN):  acetaminophen     Tablet .. 650 milliGRAM(s) Oral every 6 hours PRN Temp greater or equal to 38C (100.4F), Mild Pain (1 - 3)  aluminum hydroxide/magnesium hydroxide/simethicone Suspension 30 milliLiter(s) Oral every 4 hours PRN Dyspepsia  melatonin 3 milliGRAM(s) Oral at bedtime PRN Insomnia  ondansetron Injectable 4 milliGRAM(s) IV Push every 8 hours PRN Nausea and/or Vomiting      Allergies    No Known Drug Allergies  tape (Rash)    Intolerances        REVIEW OF SYSTEMS:  CONSTITUTIONAL: No fever or fatigue  EYES: No eye pain, visual disturbances, or discharge  ENMT:  No difficulty hearing, tinnitus, vertigo; No sinus or throat pain  NECK: No pain or stiffness  RESPIRATORY: No cough, wheezing, chills or hemoptysis; No shortness of breath  CARDIOVASCULAR: No chest pain, palpitations, dizziness, or leg swelling  GASTROINTESTINAL: No abdominal or epigastric pain. No nausea, vomiting, or hematemesis, + loose Bm x 1  GENITOURINARY: No dysuria, frequency, hematuria, or incontinence  NEUROLOGICAL: No headaches, memory loss, loss of strength, numbness, or tremors  SKIN: No itching, burning, rashes, or lesions   Vital Signs Last 24 Hrs  T(C): 36.6 (25 Dec 2023 05:58), Max: 36.7 (24 Dec 2023 14:18)  T(F): 97.9 (25 Dec 2023 05:58), Max: 98.1 (24 Dec 2023 22:14)  HR: 74 (25 Dec 2023 05:58) (74 - 80)  BP: 120/75 (25 Dec 2023 05:58) (120/75 - 129/81)  BP(mean): --  RR: 18 (25 Dec 2023 05:58) (17 - 18)  SpO2: 96% (25 Dec 2023 05:58) (94% - 96%)    Parameters below as of 25 Dec 2023 05:58  Patient On (Oxygen Delivery Method): room air        PHYSICAL EXAM:  GENERAL: NAD, well-groomed, well-developed  HEAD:  Atraumatic, Normocephalic  EYES: EOMI, PERRLA, conjunctiva and sclera clear  ENMT: No tonsillar erythema, exudates, or enlargement; Moist mucous membranes  NECK: Supple, No JVD, Normal thyroid  NERVOUS SYSTEM:  Alert & Oriented X3, Good concentration; Motor Strength 5/5 B/L upper and lower extremities  CHEST/LUNG: Clear to auscultation bilaterally; No wheezing   HEART: S1S2+,  Regular rate and rhythm  ABDOMEN: Soft, Nontender, Nondistended; Bowel sounds present  EXTREMITIES:  2+ Peripheral Pulses, No clubbing, cyanosis  LYMPH: No lymphadenopathy noted  SKIN: No rashes or lesions    LABS:                        14.9   5.18  )-----------( 310      ( 25 Dec 2023 06:02 )             44.1     25 Dec 2023 06:02    141    |  106    |  13     ----------------------------<  89     3.8     |  32     |  0.65     Ca    9.3        25 Dec 2023 06:02        CAPILLARY BLOOD GLUCOSE        BLOOD CULTURE  12-22 @ 12:45   No growth at 48 Hours  --  --  12-22 @ 12:40   No growth at 48 Hours  --  --    RADIOLOGY & ADDITIONAL TESTS:    Imaging Personally Reviewed:  [ ] YES     Consultant(s) Notes Reviewed:      Care Discussed with Consultants/Other Providers:

## 2023-12-25 NOTE — PROGRESS NOTE ADULT - PROBLEM SELECTOR PLAN 1
- history of frequent UTI's within past 6 months per patient   - past urine culture in September positive for klebsiella ESBL, sensitive in HIE  - Urine Culture and sensitivities are pending. UA + for many bacteria   - Patient has been following up with Urologist as outpatient   - CT renal hunt negative  - BC are NGTD   - Continue  ertapenem IVPB  - On  vancomycin oral for C Diff ppx. Reports diarrhea today. Continue to monitor   -  lactobacillus oral  - ID consulted, recs appreciated  -Patient to f/u with Urologist as outpatient. D/w her
- history of frequent UTI's within past 6 months per patient   - past urine culture in September positive for klebsiella ESBL, sensitive in HIE  - UA negative on admission,   - Patient has been following up with Urologist as outpatient   - CT renal hunt negative  - s/p Zosyn, fluid bolus in ED  - c/w gentle IVF  - f/u urine culture  - f/u blood culture  - Continue  ertapenem IVPB  - On  vancomycin oral for C Diff ppx  - Start lactobacillus oral  - ID consulted, recs appreciated  -Patient requested to be seen by a Urologist here. Will call on call Urologist
- history of frequent UTI's within past 6 months per patient   - past urine culture in September positive for klebsiella ESBL, sensitive in HIE  - Urine Culture + for Gm Neg Rods,  sensitivities are pending. UA + for many bacteria   - Patient has been following up with Urologist as outpatient   - CT negative for kidney stones/hydronephrosis   - BC are NGTD   - Continue  ertapenem IVPB  - On  vancomycin oral for C Diff ppx. + Diarrhea. GI PCR is negative. C diff PCR is pending. If negative will put in for imodium PRN. d/w patient   -  lactobacillus oral  - ID consulted, recs appreciated  -Patient to f/u with Urologist as outpatient. D/w her

## 2023-12-25 NOTE — PROGRESS NOTE ADULT - PROBLEM SELECTOR PLAN 3
DVT PPX: Lovenox Subq    DASH Diet ordered    DISPO: Full code

## 2023-12-25 NOTE — PROGRESS NOTE ADULT - PROBLEM SELECTOR PROBLEM 1
Urinary tract infection due to ESBL Klebsiella

## 2023-12-25 NOTE — PROGRESS NOTE ADULT - TIME BILLING
Note written by attending. meds, labs, vitals, chart reviewed. Plan d/w patient at length. Went over all the test results and plan of care.
Note written by attending. meds, labs, vitals, chart reviewed. Plan d/w patient
Note written by attending. meds, labs, vitals, chart reviewed. Plan d/w patient at length. Went over all the test results and plan of care. Patient is frustrated with overall because of being in the hospital. I explained that we have to wait for Urine culture and sensitivities to decide abut antibiotics. All other questions answered and concerns addressed to the best of my capabilities.

## 2023-12-26 ENCOUNTER — TRANSCRIPTION ENCOUNTER (OUTPATIENT)
Age: 68
End: 2023-12-26

## 2023-12-26 VITALS
HEART RATE: 77 BPM | TEMPERATURE: 98 F | SYSTOLIC BLOOD PRESSURE: 117 MMHG | DIASTOLIC BLOOD PRESSURE: 76 MMHG | OXYGEN SATURATION: 95 % | RESPIRATION RATE: 17 BRPM

## 2023-12-26 LAB
-  AMOXICILLIN/CLAVULANIC ACID: SIGNIFICANT CHANGE UP
-  AMOXICILLIN/CLAVULANIC ACID: SIGNIFICANT CHANGE UP
-  AMPICILLIN/SULBACTAM: SIGNIFICANT CHANGE UP
-  AMPICILLIN/SULBACTAM: SIGNIFICANT CHANGE UP
-  AMPICILLIN: SIGNIFICANT CHANGE UP
-  AMPICILLIN: SIGNIFICANT CHANGE UP
-  AZTREONAM: SIGNIFICANT CHANGE UP
-  AZTREONAM: SIGNIFICANT CHANGE UP
-  CEFAZOLIN: SIGNIFICANT CHANGE UP
-  CEFAZOLIN: SIGNIFICANT CHANGE UP
-  CEFEPIME: SIGNIFICANT CHANGE UP
-  CEFEPIME: SIGNIFICANT CHANGE UP
-  CEFTRIAXONE: SIGNIFICANT CHANGE UP
-  CEFTRIAXONE: SIGNIFICANT CHANGE UP
-  CEFUROXIME: SIGNIFICANT CHANGE UP
-  CEFUROXIME: SIGNIFICANT CHANGE UP
-  CIPROFLOXACIN: SIGNIFICANT CHANGE UP
-  CIPROFLOXACIN: SIGNIFICANT CHANGE UP
-  ERTAPENEM: SIGNIFICANT CHANGE UP
-  ERTAPENEM: SIGNIFICANT CHANGE UP
-  GENTAMICIN: SIGNIFICANT CHANGE UP
-  GENTAMICIN: SIGNIFICANT CHANGE UP
-  IMIPENEM: SIGNIFICANT CHANGE UP
-  IMIPENEM: SIGNIFICANT CHANGE UP
-  LEVOFLOXACIN: SIGNIFICANT CHANGE UP
-  LEVOFLOXACIN: SIGNIFICANT CHANGE UP
-  MEROPENEM: SIGNIFICANT CHANGE UP
-  MEROPENEM: SIGNIFICANT CHANGE UP
-  NITROFURANTOIN: SIGNIFICANT CHANGE UP
-  NITROFURANTOIN: SIGNIFICANT CHANGE UP
-  PIPERACILLIN/TAZOBACTAM: SIGNIFICANT CHANGE UP
-  PIPERACILLIN/TAZOBACTAM: SIGNIFICANT CHANGE UP
-  TOBRAMYCIN: SIGNIFICANT CHANGE UP
-  TOBRAMYCIN: SIGNIFICANT CHANGE UP
-  TRIMETHOPRIM/SULFAMETHOXAZOLE: SIGNIFICANT CHANGE UP
-  TRIMETHOPRIM/SULFAMETHOXAZOLE: SIGNIFICANT CHANGE UP
CULTURE RESULTS: ABNORMAL
CULTURE RESULTS: ABNORMAL
METHOD TYPE: SIGNIFICANT CHANGE UP
METHOD TYPE: SIGNIFICANT CHANGE UP
ORGANISM # SPEC MICROSCOPIC CNT: ABNORMAL
ORGANISM # SPEC MICROSCOPIC CNT: ABNORMAL
ORGANISM # SPEC MICROSCOPIC CNT: SIGNIFICANT CHANGE UP
ORGANISM # SPEC MICROSCOPIC CNT: SIGNIFICANT CHANGE UP
SPECIMEN SOURCE: SIGNIFICANT CHANGE UP
SPECIMEN SOURCE: SIGNIFICANT CHANGE UP

## 2023-12-26 PROCEDURE — 81001 URINALYSIS AUTO W/SCOPE: CPT

## 2023-12-26 PROCEDURE — 83605 ASSAY OF LACTIC ACID: CPT

## 2023-12-26 PROCEDURE — 99285 EMERGENCY DEPT VISIT HI MDM: CPT | Mod: 25

## 2023-12-26 PROCEDURE — 36415 COLL VENOUS BLD VENIPUNCTURE: CPT

## 2023-12-26 PROCEDURE — 85025 COMPLETE CBC W/AUTO DIFF WBC: CPT

## 2023-12-26 PROCEDURE — 74176 CT ABD & PELVIS W/O CONTRAST: CPT | Mod: MA

## 2023-12-26 PROCEDURE — 87040 BLOOD CULTURE FOR BACTERIA: CPT

## 2023-12-26 PROCEDURE — 80053 COMPREHEN METABOLIC PANEL: CPT

## 2023-12-26 PROCEDURE — 87493 C DIFF AMPLIFIED PROBE: CPT

## 2023-12-26 PROCEDURE — 93005 ELECTROCARDIOGRAM TRACING: CPT

## 2023-12-26 PROCEDURE — 99239 HOSP IP/OBS DSCHRG MGMT >30: CPT

## 2023-12-26 PROCEDURE — 87077 CULTURE AEROBIC IDENTIFY: CPT

## 2023-12-26 PROCEDURE — 85027 COMPLETE CBC AUTOMATED: CPT

## 2023-12-26 PROCEDURE — 80048 BASIC METABOLIC PNL TOTAL CA: CPT

## 2023-12-26 PROCEDURE — 87507 IADNA-DNA/RNA PROBE TQ 12-25: CPT

## 2023-12-26 PROCEDURE — 96374 THER/PROPH/DIAG INJ IV PUSH: CPT

## 2023-12-26 PROCEDURE — 87086 URINE CULTURE/COLONY COUNT: CPT

## 2023-12-26 PROCEDURE — 99232 SBSQ HOSP IP/OBS MODERATE 35: CPT

## 2023-12-26 PROCEDURE — 87186 SC STD MICRODIL/AGAR DIL: CPT

## 2023-12-26 RX ORDER — ERTAPENEM SODIUM 1 G/1
1000 INJECTION, POWDER, LYOPHILIZED, FOR SOLUTION INTRAMUSCULAR; INTRAVENOUS EVERY 24 HOURS
Refills: 0 | Status: COMPLETED | OUTPATIENT
Start: 2023-12-26 | End: 2023-12-26

## 2023-12-26 RX ORDER — ACETAMINOPHEN 500 MG
2 TABLET ORAL
Qty: 0 | Refills: 0 | DISCHARGE
Start: 2023-12-26

## 2023-12-26 RX ORDER — VANCOMYCIN HCL 1 G
1 VIAL (EA) INTRAVENOUS
Qty: 5 | Refills: 0
Start: 2023-12-26 | End: 2023-12-30

## 2023-12-26 RX ADMIN — LOSARTAN POTASSIUM 50 MILLIGRAM(S): 100 TABLET, FILM COATED ORAL at 05:08

## 2023-12-26 RX ADMIN — Medication 125 MILLIGRAM(S): at 12:51

## 2023-12-26 RX ADMIN — Medication 1 TABLET(S): at 12:54

## 2023-12-26 RX ADMIN — Medication 30 MILLILITER(S): at 12:18

## 2023-12-26 RX ADMIN — ERTAPENEM SODIUM 120 MILLIGRAM(S): 1 INJECTION, POWDER, LYOPHILIZED, FOR SOLUTION INTRAMUSCULAR; INTRAVENOUS at 12:53

## 2023-12-26 NOTE — CARE COORDINATION ASSESSMENT. - OTHER PERTINENT REFERRAL INFORMATION
67 y/o female admitted from home with UTI, indep with ADLS, alert and oriented. Pt has no prior services in place and plan remains home upon DC from hospital. Pt has support in community from adult children (daughter) and friends.  69 y/o female admitted from home with UTI, indep with ADLS, alert and oriented. Pt DC home today, drove self home as per RN. Pt has no prior services in place and plan remains home upon DC from hospital. Pt has support in community from adult children (daughter) and friends.

## 2023-12-26 NOTE — DISCHARGE NOTE PROVIDER - NSDCMRMEDTOKEN_GEN_ALL_CORE_FT
acetaminophen 325 mg oral tablet: 2 tab(s) orally every 6 hours As needed Temp greater or equal to 38C (100.4F), Mild Pain (1 - 3)  hydroCHLOROthiazide 25 mg oral tablet: 1 orally once a day  irbesartan 150 mg oral tablet: 1 orally once a day   acetaminophen 325 mg oral tablet: 2 tab(s) orally every 6 hours As needed Temp greater or equal to 38C (100.4F), Mild Pain (1 - 3)  hydroCHLOROthiazide 25 mg oral tablet: 1 orally once a day  irbesartan 150 mg oral tablet: 1 orally once a day  vancomycin 125 mg oral capsule: 1 cap(s) orally once a day

## 2023-12-26 NOTE — DISCHARGE NOTE PROVIDER - CARE PROVIDER_API CALL
Kyung Godinez  Internal Medicine  [address] [place] [id number]  Phone: [phone]  Fax: [phone]  Follow Up Time:     Kamryn Dumas  Family Medicine  [address] [facility], [address] [place] [address]  Phone: [phone]  Fax: [phone]  Follow Up Time:    Kyung Godinez  Internal Medicine  [address] [place] [id number]  Phone: [phone]  Fax: [phone]  Follow Up Time:     Kamryn Dumas  Family Medicine  [address] [facility], [address] [place] [address]  Phone: [phone]  Fax: [phone]  Follow Up Time:

## 2023-12-26 NOTE — DISCHARGE NOTE PROVIDER - HOSPITAL COURSE
Patient treated with IV Invanz for Klebsiealla UTI as per ID recommendations. CT abdomen negative for Kidney stones. Patient has completed the course of antibiotics. Will f/u with her Urologist and Gyn as out patient as recommended by ID. VSS    GENERAL: NAD, well-groomed, well-developed  HEAD:  Atraumatic, Normocephalic  EYES: EOMI, PERRLA, conjunctiva and sclera clear  ENMT: No tonsillar erythema, exudates, or enlargement; Moist mucous membranes  NECK: Supple, No JVD, Normal thyroid  NERVOUS SYSTEM:  Alert & Oriented X3, Good concentration; Motor Strength 5/5 B/L upper and lower extremities  CHEST/LUNG: Clear to auscultation bilaterally; No wheezing   HEART: S1S2+,  Regular rate and rhythm  ABDOMEN: Soft, Nontender, Nondistended; Bowel sounds present  EXTREMITIES:  2+ Peripheral Pulses, No clubbing, cyanosis  LYMPH: No lymphadenopathy noted  SKIN: No rashes or lesions      Total discharge time spent 50 minutes including medication reconciliation, counseling and education.

## 2023-12-26 NOTE — CARE COORDINATION ASSESSMENT. - NSCAREPROVIDERS_GEN_ALL_CORE_FT
CARE PROVIDERS:  Accepting Physician: Kelvin Newton  Administration: Michael Blackmon  Admitting: Kelvin Newton  Attending: Alycia Vega  Case Management: Rajesh Garcia  Consultant: Kyung Godinez  ED Attending: Faisal Price ED Nurse: Indu Harden  Infection Control: Katarzyna Julio  Nurse: Elinor Cesar  Nurse: Federica Moise  Ordered: ADM, User  Ordered: Charlotte Mclaughlin  Ordered: Kyung Godinez  Override: Sejal Cuello  PCA/Nursing Assistant: Sejal Cuello  Primary Team: Alycia Vega  Primary Team: Lei Rodriguez  Registered Dietitian: Luz Amato  : Ember Atkinson  Team: [facility]  Hospitalists, Team

## 2023-12-26 NOTE — CARE COORDINATION ASSESSMENT. - NSPASTMEDSURGHISTORY_GEN_ALL_CORE_FT
PAST MEDICAL & SURGICAL HISTORY:  HTN (hypertension)      S/P cataract surgery, left      S/P knee replacement  left      S/P appy

## 2023-12-26 NOTE — DISCHARGE NOTE NURSING/CASE MANAGEMENT/SOCIAL WORK - PATIENT PORTAL LINK FT
You can access the FollowMyHealth Patient Portal offered by [facility] by registering at the following website: [email or web address]. By joining [employer]’s FollowMyHealth portal, you will also be able to view your health information using other applications (apps) compatible with our system. You can access the FollowMyHealth Patient Portal offered by [facility] by registering at the following website: [email or web address]. By joining [email or web address]’s FollowMyHealth portal, you will also be able to view your health information using other applications (apps) compatible with our system.

## 2023-12-26 NOTE — DISCHARGE NOTE PROVIDER - NSDCCPCAREPLAN_GEN_ALL_CORE_FT
PRINCIPAL DISCHARGE DIAGNOSIS  Diagnosis: Infection, Klebsiella  Assessment and Plan of Treatment: You have completed course of IV antibiotics as recommended by ID.   f/u with your Urologist and GYN as recommended  if you have any questions about infection f/u with Dr. Godinez, ID number is attched  OTC med for eye stye is recommended. f/u with PCP in 2-3 days  healthy diet and exercise is recommended     PRINCIPAL DISCHARGE DIAGNOSIS  Diagnosis: Infection, Klebsiella  Assessment and Plan of Treatment: You have completed course of IV antibiotics as recommended by ID.   f/u with your Urologist and GYN as recommended  if you have any questions about infection f/u with Dr. Godinez, ID number is attched. She has recommended 5 more days of oral vanconycin for C diff prophylaxis. prescription sent to the pharmacy   OTC med for eye stye is recommended. f/u with PCP in 2-3 days  healthy diet and exercise is recommended

## 2023-12-26 NOTE — DISCHARGE NOTE PROVIDER - PROVIDER TOKENS
PROVIDER:[TOKEN:[[id number]]],PROVIDER:[TOKEN:[[id number]]] PROVIDER:[TOKEN:[[id number]]],PROVIDER:[TOKEN:[[id number]]]

## 2023-12-26 NOTE — PROGRESS NOTE ADULT - SUBJECTIVE AND OBJECTIVE BOX
[facility]  INFECTIOUS DISEASES - [address], [place] [address]  Tel: [phone]     Fax: [phone]  =======================================================    TANIA VICTOR [id number]    Follow up: No fevers. Reports feeling better overall.    Allergies:  No Known Drug Allergies  tape (Rash)      Antibiotics:  acetaminophen     Tablet .. 650 milliGRAM(s) Oral every 6 hours PRN  aluminum hydroxide/magnesium hydroxide/simethicone Suspension 30 milliLiter(s) Oral every 4 hours PRN  enoxaparin Injectable 40 milliGRAM(s) SubCutaneous every 24 hours  hydrochlorothiazide 25 milliGRAM(s) Oral daily  influenza  Vaccine (HIGH DOSE) 0.7 milliLiter(s) IntraMuscular once  lactobacillus acidophilus 1 Tablet(s) Oral daily  losartan 50 milliGRAM(s) Oral daily  melatonin 3 milliGRAM(s) Oral at bedtime PRN  ondansetron Injectable 4 milliGRAM(s) IV Push every 8 hours PRN  vancomycin Capsule. 125 milliGRAM(s) Oral daily       REVIEW OF SYSTEMS:  CONSTITUTIONAL:  No Fever or chills  HEENT:  No sore throat or runny nose.  CARDIOVASCULAR:  No chest pain or SOB.  RESPIRATORY:  No cough, shortness of breath  GASTROINTESTINAL:  No nausea, vomiting or diarrhea.  MUSCULOSKELETAL:  no joint aches, no muscle pain  NEUROLOGIC:  No headache or dizziness  PSYCHIATRIC:  No disorder of thought or mood.       Physical Exam:  ICU Vital Signs Last 24 Hrs  T(C): 36.8 (26 Dec 2023 13:07), Max: 36.8 (26 Dec 2023 13:07)  T(F): 98.3 (26 Dec 2023 13:07), Max: 98.3 (26 Dec 2023 13:07)  HR: 77 (26 Dec 2023 13:07) (77 - 85)  BP: 117/76 (26 Dec 2023 13:07) (117/76 - 127/71)  BP(mean): --  ABP: --  ABP(mean): --  RR: 17 (26 Dec 2023 13:07) (17 - 18)  SpO2: 95% (26 Dec 2023 13:07) (92% - 95%)    O2 Parameters below as of 26 Dec 2023 13:07  Patient On (Oxygen Delivery Method): room air           GEN: NAD  HEENT: normocephalic and atraumatic.   NECK: Supple.   LUNGS: Normal respiratory effort  HEART: Regular rate and rhythm   ABDOMEN: Soft, nontender, and nondistended.    EXTREMITIES: No leg edema.  NEUROLOGIC: grossly intact.  PSYCHIATRIC: Appropriate affect .      Labs:  12-25    141  |  106  |  13  ----------------------------<  89  3.8   |  32<H>  |  0.65    Ca    9.3      25 Dec 2023 06:02                            14.9   5.18  )-----------( 310      ( 25 Dec 2023 06:02 )             44.1       Urinalysis Basic - ( 25 Dec 2023 06:02 )    Color: x / Appearance: x / SG: x / pH: x  Gluc: 89 mg/dL / Ketone: x  / Bili: x / Urobili: x   Blood: x / Protein: x / Nitrite: x   Leuk Esterase: x / RBC: x / WBC x   Sq Epi: x / Non Sq Epi: x / Bacteria: x          RECENT CULTURES:  12-22 @ 12:45 .Blood Blood-Peripheral Klebsiella pneumoniae ESBL    No growth at 72 Hours        12-22 @ 12:40 .Blood Blood-Peripheral     No growth at 72 Hours              All imaging and data are reviewed.    [facility]  INFECTIOUS DISEASES - [address], [place] [address]  Tel: [phone]     Fax: [phone]  =======================================================    TANIA VICTOR [id number]    Follow up: No fevers. Reports feeling better overall.    Allergies:  No Known Drug Allergies  tape (Rash)      Antibiotics:  acetaminophen     Tablet .. 650 milliGRAM(s) Oral every 6 hours PRN  aluminum hydroxide/magnesium hydroxide/simethicone Suspension 30 milliLiter(s) Oral every 4 hours PRN  enoxaparin Injectable 40 milliGRAM(s) SubCutaneous every 24 hours  hydrochlorothiazide 25 milliGRAM(s) Oral daily  influenza  Vaccine (HIGH DOSE) 0.7 milliLiter(s) IntraMuscular once  lactobacillus acidophilus 1 Tablet(s) Oral daily  losartan 50 milliGRAM(s) Oral daily  melatonin 3 milliGRAM(s) Oral at bedtime PRN  ondansetron Injectable 4 milliGRAM(s) IV Push every 8 hours PRN  vancomycin Capsule. 125 milliGRAM(s) Oral daily       REVIEW OF SYSTEMS:  CONSTITUTIONAL:  No Fever or chills  HEENT:  No sore throat or runny nose.  CARDIOVASCULAR:  No chest pain or SOB.  RESPIRATORY:  No cough, shortness of breath  GASTROINTESTINAL:  No nausea, vomiting or diarrhea.  MUSCULOSKELETAL:  no joint aches, no muscle pain  NEUROLOGIC:  No headache or dizziness  PSYCHIATRIC:  No disorder of thought or mood.       Physical Exam:  ICU Vital Signs Last 24 Hrs  T(C): 36.8 (26 Dec 2023 13:07), Max: 36.8 (26 Dec 2023 13:07)  T(F): 98.3 (26 Dec 2023 13:07), Max: 98.3 (26 Dec 2023 13:07)  HR: 77 (26 Dec 2023 13:07) (77 - 85)  BP: 117/76 (26 Dec 2023 13:07) (117/76 - 127/71)  BP(mean): --  ABP: --  ABP(mean): --  RR: 17 (26 Dec 2023 13:07) (17 - 18)  SpO2: 95% (26 Dec 2023 13:07) (92% - 95%)    O2 Parameters below as of 26 Dec 2023 13:07  Patient On (Oxygen Delivery Method): room air           GEN: NAD  HEENT: normocephalic and atraumatic.   NECK: Supple.   LUNGS: Normal respiratory effort  HEART: Regular rate and rhythm   ABDOMEN: Soft, nontender, and nondistended.    EXTREMITIES: No leg edema.  NEUROLOGIC: grossly intact.  PSYCHIATRIC: Appropriate affect .      Labs:  12-25    141  |  106  |  13  ----------------------------<  89  3.8   |  32<H>  |  0.65    Ca    9.3      25 Dec 2023 06:02                            14.9   5.18  )-----------( 310      ( 25 Dec 2023 06:02 )             44.1       Urinalysis Basic - ( 25 Dec 2023 06:02 )    Color: x / Appearance: x / SG: x / pH: x  Gluc: 89 mg/dL / Ketone: x  / Bili: x / Urobili: x   Blood: x / Protein: x / Nitrite: x   Leuk Esterase: x / RBC: x / WBC x   Sq Epi: x / Non Sq Epi: x / Bacteria: x          RECENT CULTURES:  12-22 @ 12:45 .Blood Blood-Peripheral Klebsiella pneumoniae ESBL    No growth at 72 Hours        12-22 @ 12:40 .Blood Blood-Peripheral     No growth at 72 Hours              All imaging and data are reviewed.

## 2023-12-26 NOTE — PROGRESS NOTE ADULT - ASSESSMENT
68-year-old female with hx of UTI's, c diff infection (9/2023), who presented with dysuria and lower abdominal pain. Reports LUTS for the past 3 days. Had recent urine cultures collected by urologist which grew ESBL Klebsiella.     Suspect this is uncomplicated cystitis, patient reports feeling much better. Will treat with 5 days of antibiotics, would not treat with a longer course especially given recent c diff. No fevers and no leukocytosis. Blood cultures remain no growth. Urine culture growing ESBL klebsiella. GI PCR and c diff negative. CT showed no renal stones or hydronephrosis.         #ESBL klebsiella UTI  #Hx of c diff infection    -continue ertapenem 1g IV q24h until today to complete 5 days  -extend vancomycin 125mg PO daily for 5 more days for c diff ppx  -follow cultures to completion  -suggest GYN evaluation as outpatient for possible vaginal atrophy--could be contributing to some of her urinary symptoms  -discussed with Dr. Silvia Godinez MD  Division of Infectious Diseases   Cell [phone] between 8am and 6pm   After 6pm and weekends please call ID service at [phone].     35 minutes spent on total encounter assessing patient, examination, chart review, counseling and coordinating care by the attending physician/nurse/care manager.        68-year-old female with hx of UTI's, c diff infection (9/2023), who presented with dysuria and lower abdominal pain. Reports LUTS for the past 3 days. Had recent urine cultures collected by urologist which grew ESBL Klebsiella.     Suspect this is uncomplicated cystitis, patient reports feeling much better. Will treat with 5 days of antibiotics, would not treat with a longer course especially given recent c diff. No fevers and no leukocytosis. Blood cultures remain no growth. Urine culture growing ESBL klebsiella. GI PCR and c diff negative. CT showed no renal stones or hydronephrosis.         #ESBL klebsiella UTI  #Hx of c diff infection    -continue ertapenem 1g IV q24h until today to complete 5 days  -extend vancomycin 125mg PO daily for 5 more days for c diff ppx  -follow cultures to completion  -suggest GYN evaluation as outpatient for possible vaginal atrophy--could be contributing to some of her urinary symptoms  -discussed with Dr. Silvia Godinez MD  Division of Infectious Diseases   Cell [phone] between 8am and 6pm   After 6pm and weekends please call ID service at [phone].     35 minutes spent on total encounter assessing patient, examination, chart review, counseling and coordinating care by the attending physician/nurse/care manager.

## 2023-12-27 LAB
CULTURE RESULTS: SIGNIFICANT CHANGE UP
SPECIMEN SOURCE: SIGNIFICANT CHANGE UP

## 2024-01-06 ENCOUNTER — TRANSCRIPTION ENCOUNTER (OUTPATIENT)
Age: 69
End: 2024-01-06

## 2024-01-14 ENCOUNTER — NON-APPOINTMENT (OUTPATIENT)
Age: 69
End: 2024-01-14

## 2024-01-16 ENCOUNTER — NON-APPOINTMENT (OUTPATIENT)
Age: 69
End: 2024-01-16

## 2024-01-17 ENCOUNTER — APPOINTMENT (OUTPATIENT)
Dept: INFECTIOUS DISEASE | Facility: CLINIC | Age: 69
End: 2024-01-17
Payer: MEDICARE

## 2024-01-17 VITALS
TEMPERATURE: 98.2 F | WEIGHT: 195 LBS | OXYGEN SATURATION: 97 % | BODY MASS INDEX: 33.47 KG/M2 | HEART RATE: 80 BPM | DIASTOLIC BLOOD PRESSURE: 84 MMHG | SYSTOLIC BLOOD PRESSURE: 126 MMHG

## 2024-01-17 DIAGNOSIS — R19.7 DIARRHEA, UNSPECIFIED: ICD-10-CM

## 2024-01-17 DIAGNOSIS — N39.0 URINARY TRACT INFECTION, SITE NOT SPECIFIED: ICD-10-CM

## 2024-01-17 PROCEDURE — 99213 OFFICE O/P EST LOW 20 MIN: CPT

## 2024-01-18 LAB
APPEARANCE: CLEAR
BILIRUBIN URINE: NEGATIVE
BLOOD URINE: NEGATIVE
COLOR: NORMAL
GLUCOSE QUALITATIVE U: NEGATIVE MG/DL
KETONES URINE: NEGATIVE MG/DL
LEUKOCYTE ESTERASE URINE: NEGATIVE
NITRITE URINE: NEGATIVE
PH URINE: 6
PROTEIN URINE: NEGATIVE MG/DL
SPECIFIC GRAVITY URINE: 1.02
UROBILINOGEN URINE: 0.2 MG/DL

## 2024-01-18 NOTE — ASSESSMENT
[FreeTextEntry1] : 68-year-old female with hx of UTI's, c diff infection (9/2023), who presents for hosptial follow up. She was recently at [facility] and treated for  ESBL Klebsiella cystitis, C diff at that time was negative.  Patient currently without urinary symptoms. Discussed with patient that would have to be careful with antibiotics given risk of recurrent c diff and selection for more resistant organisms as she had already grown ESBL klebsiella more than once previously.  #Recurrent UTI #Hx of c diff infection  -no current indication for antibiotics -can check urinalysis  -can do stool O&P -if develops fevers, urinary symptoms or feels unwell advised to go to the ER for IV antibiotics -follow up with Urology and GYN -follow up with ID as needed  Kyung Godinez MD Department of Infectious Disease [facility]

## 2024-01-18 NOTE — PHYSICAL EXAM
[General Appearance - Alert] : alert [General Appearance - In No Acute Distress] : in no acute distress [] : no respiratory distress [Respiration, Rhythm And Depth] : normal respiratory rhythm and effort [Heart Rate And Rhythm] : heart rate was normal and rhythm regular [Edema] : there was no peripheral edema [Abdomen Soft] : soft [Abdomen Tenderness] : non-tender [No Focal Deficits] : no focal deficits [Oriented To Time, Place, And Person] : oriented to person, place, and time [Affect] : the affect was normal

## 2024-01-18 NOTE — REVIEW OF SYSTEMS
[As Noted in HPI] : as noted in HPI [Fever] : no fever [Chills] : no chills [Chest Pain] : no chest pain [Lower Ext Edema] : no lower extremity edema [Shortness Of Breath] : no shortness of breath [Cough] : no cough [Abdominal Pain] : no abdominal pain [Vomiting] : no vomiting [Diarrhea] : no diarrhea [Joint Pain] : no joint pain [Joint Swelling] : no joint swelling [Confused] : no confusion [Dizziness] : no dizziness

## 2024-01-18 NOTE — HISTORY OF PRESENT ILLNESS
[FreeTextEntry1] : 68-year-old female with hx of UTI's, c diff infection (9/2023), who presents for hosptial follow up. She was recently at [facility] and treated for  ESBL Klebsiella cystitis, C diff at that time was negative.  Patient reports doing fine. Reports occasional "burning" sensation which resolves on its own. Currently denies any urinary symptoms. Denies any fevers, chills, back pain, suprapubic pain, nausea, vomiting or diarrhea. She has seen a Urologist. She says she gets her regular GYN exams and was told things are "fine".

## 2024-01-19 LAB — BACTERIA UR CULT: NORMAL

## 2024-02-12 ENCOUNTER — NON-APPOINTMENT (OUTPATIENT)
Age: 69
End: 2024-02-12

## 2025-02-05 NOTE — ED PROVIDER NOTE - CADM POA PRESS ULCER
Speech Language Pathology      Eneida Majano  MRN: [id number]    1300PM:  Patient not appropriate for swallow eval this date, pt not following commands and not tolerating oral PO trials.   Will follow up as able.         2/5/2025     No

## 2025-02-28 NOTE — DISCHARGE NOTE PROVIDER - NSDCADMDATE_GEN_ALL_CORE_FT
22-Dec-2023 15:00
Pt presents to ED by EMS from cardiology office c/o 4 days CP on exertion. Hx CHF. Received nitro and 324mg aspirin PTA. IV by EMS. Hx Kidney transplant 2007. EKG in progress.

## 2025-09-16 ENCOUNTER — NON-APPOINTMENT (OUTPATIENT)
Age: 70
End: 2025-09-16